# Patient Record
Sex: MALE | Race: WHITE | NOT HISPANIC OR LATINO | Employment: FULL TIME | URBAN - NONMETROPOLITAN AREA
[De-identification: names, ages, dates, MRNs, and addresses within clinical notes are randomized per-mention and may not be internally consistent; named-entity substitution may affect disease eponyms.]

---

## 2023-02-05 PROBLEM — I45.81 LONG QT SYNDROME ASSOCIATED WITH MUTATION IN KCNQ1 GENE: Status: ACTIVE | Noted: 2023-02-05

## 2023-02-05 PROBLEM — K57.90 DIVERTICULOSIS: Status: ACTIVE | Noted: 2023-02-05

## 2023-02-05 PROBLEM — J11.1 INFLUENZA: Status: ACTIVE | Noted: 2023-02-05

## 2023-02-05 PROBLEM — R25.2 MUSCLE CRAMPS: Status: ACTIVE | Noted: 2023-02-05

## 2023-02-05 PROBLEM — M54.30 SCIATICA: Status: ACTIVE | Noted: 2023-02-05

## 2023-02-05 PROBLEM — K21.9 GERD (GASTROESOPHAGEAL REFLUX DISEASE): Status: ACTIVE | Noted: 2023-02-05

## 2023-02-05 PROBLEM — F17.200 NICOTINE DEPENDENCE: Status: ACTIVE | Noted: 2023-02-05

## 2023-02-05 PROBLEM — Z15.89: Status: ACTIVE | Noted: 2023-02-05

## 2023-02-05 PROBLEM — K57.32 DIVERTICULITIS OF COLON: Status: ACTIVE | Noted: 2023-02-05

## 2023-02-05 PROBLEM — R73.09 ELEVATED GLUCOSE: Status: ACTIVE | Noted: 2023-02-05

## 2023-02-05 PROBLEM — E03.9 HYPOTHYROIDISM: Status: ACTIVE | Noted: 2023-02-05

## 2023-02-05 PROBLEM — M79.605 PAIN OF LEFT LOWER EXTREMITY: Status: ACTIVE | Noted: 2023-02-05

## 2023-02-05 RX ORDER — LEVOTHYROXINE SODIUM 75 UG/1
1 TABLET ORAL DAILY
COMMUNITY
Start: 2022-03-28 | End: 2023-03-09 | Stop reason: SDUPTHER

## 2023-02-05 RX ORDER — MELOXICAM 15 MG/1
1 TABLET ORAL DAILY
COMMUNITY
Start: 2022-09-13

## 2023-02-23 LAB
C REACTIVE PROTEIN (MG/L) IN SER/PLAS: 0.41 MG/DL
SEDIMENTATION RATE, ERYTHROCYTE: 9 MM/H (ref 0–15)

## 2023-02-24 LAB
ANA PATTERN: ABNORMAL
ANA TITER: ABNORMAL
ANTI-NUCLEAR ANTIBODY (ANA): POSITIVE

## 2023-03-09 ENCOUNTER — OFFICE VISIT (OUTPATIENT)
Dept: PRIMARY CARE | Facility: CLINIC | Age: 48
End: 2023-03-09
Payer: COMMERCIAL

## 2023-03-09 VITALS
SYSTOLIC BLOOD PRESSURE: 142 MMHG | HEIGHT: 72 IN | DIASTOLIC BLOOD PRESSURE: 96 MMHG | WEIGHT: 178.2 LBS | BODY MASS INDEX: 24.14 KG/M2 | HEART RATE: 81 BPM

## 2023-03-09 DIAGNOSIS — M54.32 SCIATICA OF LEFT SIDE: ICD-10-CM

## 2023-03-09 DIAGNOSIS — R25.2 MUSCLE CRAMPS: ICD-10-CM

## 2023-03-09 DIAGNOSIS — E03.9 ACQUIRED HYPOTHYROIDISM: ICD-10-CM

## 2023-03-09 DIAGNOSIS — I45.81 LONG QT SYNDROME ASSOCIATED WITH MUTATION IN KCNQ1 GENE: ICD-10-CM

## 2023-03-09 DIAGNOSIS — K21.9 GASTROESOPHAGEAL REFLUX DISEASE WITHOUT ESOPHAGITIS: ICD-10-CM

## 2023-03-09 DIAGNOSIS — F17.210 CIGARETTE NICOTINE DEPENDENCE WITHOUT COMPLICATION: Primary | ICD-10-CM

## 2023-03-09 PROCEDURE — 99214 OFFICE O/P EST MOD 30 MIN: CPT | Performed by: REGISTERED NURSE

## 2023-03-09 RX ORDER — PANTOPRAZOLE SODIUM 40 MG/1
40 TABLET, DELAYED RELEASE ORAL
COMMUNITY
Start: 2023-02-25

## 2023-03-09 RX ORDER — DIPHENHYDRAMINE HCL 25 MG
4 CAPSULE ORAL AS NEEDED
Qty: 100 EACH | Refills: 0 | Status: SHIPPED | OUTPATIENT
Start: 2023-03-09 | End: 2023-06-08 | Stop reason: SINTOL

## 2023-03-09 RX ORDER — LEVOTHYROXINE SODIUM 88 UG/1
88 TABLET ORAL DAILY
Qty: 30 TABLET | Refills: 2 | Status: SHIPPED | OUTPATIENT
Start: 2023-03-09 | End: 2023-04-04

## 2023-03-09 RX ORDER — NAPROXEN 500 MG/1
500 TABLET ORAL
COMMUNITY
Start: 2022-06-18 | End: 2023-04-21 | Stop reason: ALTCHOICE

## 2023-03-09 RX ORDER — ASPIRIN 81 MG/1
81 TABLET ORAL DAILY
COMMUNITY

## 2023-03-09 ASSESSMENT — ENCOUNTER SYMPTOMS
RHINORRHEA: 0
CONSTIPATION: 0
DIARRHEA: 0
FEVER: 0
NERVOUS/ANXIOUS: 0
CONFUSION: 0
WEAKNESS: 0
COUGH: 0
DIZZINESS: 0
WHEEZING: 0
ABDOMINAL PAIN: 0
DIFFICULTY URINATING: 0
WOUND: 0
SHORTNESS OF BREATH: 0
NAUSEA: 0
HEADACHES: 0
FREQUENCY: 0
EYE REDNESS: 0
CHILLS: 0
EYE DISCHARGE: 0
VOMITING: 0

## 2023-03-09 NOTE — PROGRESS NOTES
Subjective   Patient ID: Sanford Jones is a 47 y.o. male who presents for Follow-up (Discuss thyroid. ).    HPI     Saw cardiology and they told him that he does have raynaud's and that he had a positive LYNETTE. He is going to see Rheumatology, information provided for Dr. Mendez.     Hypothyroidism: Had his thyroid checked in December and it was elevated at 11.95  He has been on levothyroxine 75mcg daily and tolerating well.      Nicotine Dependence: He is a smoker and had quit for 3 years, with his new job he started smoking again. He has quit off and on multiple times. He has been thinking about quitting.   Would like to stop smoking: He tried cold turkey. He smokes when he is stressed, but sometimes only uses the cigarettes as a habit and does not actually smoke it. Discussed options like gum and patches. Currently about 0.5-1ppd. Depending on the day      GERD- Controlled on medication Pantoprazole 40mg twice daily      All other systems reviewed and negative for complaint unless stated above.      PMH: Hypothyroidism, Frost bite, GERD, hernia   Surgery: none   Family: Brother with hyperthyroid. DM, diverticulitis, Arthritis, heart disease.   smoker: yes, smokes about half a pack to a pack. since he was 10   Etoh: yes, not often on the weekends, one or two on a Saturday might have 6   Drug use: none        Review of Systems   Constitutional:  Negative for chills and fever.   HENT:  Negative for congestion, ear pain and rhinorrhea.    Eyes:  Negative for discharge and redness.   Respiratory:  Negative for cough, shortness of breath and wheezing.    Cardiovascular:  Negative for chest pain and leg swelling.   Gastrointestinal:  Negative for abdominal pain, constipation, diarrhea, nausea and vomiting.   Genitourinary:  Negative for difficulty urinating, frequency and urgency.   Musculoskeletal:  Negative for gait problem.   Skin:  Negative for rash and wound.   Neurological:  Negative for dizziness, weakness  and headaches.   Psychiatric/Behavioral:  Negative for confusion. The patient is not nervous/anxious.        Objective   BP (!) 142/96 (BP Location: Right arm)   Pulse 81   Ht 1.829 m (6')   Wt 80.8 kg (178 lb 3.2 oz)   BMI 24.17 kg/m²     Physical Exam  Vitals reviewed.   Constitutional:       Appearance: Normal appearance.   HENT:      Head: Normocephalic.      Right Ear: Tympanic membrane, ear canal and external ear normal.      Left Ear: Tympanic membrane, ear canal and external ear normal.      Nose: No rhinorrhea.      Mouth/Throat:      Mouth: Mucous membranes are moist.      Pharynx: Oropharynx is clear.   Eyes:      Pupils: Pupils are equal, round, and reactive to light.   Cardiovascular:      Rate and Rhythm: Normal rate and regular rhythm.      Pulses: Normal pulses.   Pulmonary:      Effort: Pulmonary effort is normal.      Breath sounds: Normal breath sounds.   Abdominal:      General: Abdomen is flat. Bowel sounds are normal.      Palpations: Abdomen is soft.   Musculoskeletal:         General: No tenderness. Normal range of motion.      Right lower leg: No edema.      Left lower leg: No edema.   Lymphadenopathy:      Cervical: No cervical adenopathy.   Skin:     General: Skin is warm and dry.      Findings: No rash.   Neurological:      Mental Status: He is alert and oriented to person, place, and time.   Psychiatric:         Mood and Affect: Mood normal.         Behavior: Behavior normal.       Assessment/Plan        #nicotine dependence   Trial gum      #Hypothyroidism   Increased to 88mcg today   Check tsh in 6 weeks   Follow up after tsh check     #Positive LYNETTE  #Raynauds  Referral for rhumatology   May try calcium channel blocker in the future      #Muscle cramps  #Leg pain   naproxen is not that good anymore  Trial meloxicam   may try diclofenac next   encouraged hydration      #GERD  #Diverticulosis  on pantoprazole  no acute issues     #BMI 35   encourage diet and exercise   following with  GI at [facility]     #HCM  Colonoscopy: Done within the past 2 years

## 2023-04-01 DIAGNOSIS — E03.9 ACQUIRED HYPOTHYROIDISM: ICD-10-CM

## 2023-04-04 RX ORDER — LEVOTHYROXINE SODIUM 88 UG/1
TABLET ORAL
Qty: 30 TABLET | Refills: 2 | Status: SHIPPED | OUTPATIENT
Start: 2023-04-04 | End: 2023-04-21

## 2023-04-19 ENCOUNTER — LAB (OUTPATIENT)
Dept: LAB | Facility: LAB | Age: 48
End: 2023-04-19
Payer: COMMERCIAL

## 2023-04-19 DIAGNOSIS — E03.9 ACQUIRED HYPOTHYROIDISM: ICD-10-CM

## 2023-04-19 LAB
THYROTROPIN (MIU/L) IN SER/PLAS BY DETECTION LIMIT <= 0.05 MIU/L: 11.08 MIU/L (ref 0.44–3.98)
THYROXINE (T4) FREE (NG/DL) IN SER/PLAS: 0.98 NG/DL (ref 0.61–1.12)

## 2023-04-19 PROCEDURE — 84439 ASSAY OF FREE THYROXINE: CPT

## 2023-04-19 PROCEDURE — 36415 COLL VENOUS BLD VENIPUNCTURE: CPT

## 2023-04-19 PROCEDURE — 84443 ASSAY THYROID STIM HORMONE: CPT

## 2023-04-21 ENCOUNTER — OFFICE VISIT (OUTPATIENT)
Dept: PRIMARY CARE | Facility: CLINIC | Age: 48
End: 2023-04-21
Payer: COMMERCIAL

## 2023-04-21 VITALS
BODY MASS INDEX: 24.24 KG/M2 | SYSTOLIC BLOOD PRESSURE: 141 MMHG | HEART RATE: 87 BPM | WEIGHT: 179 LBS | DIASTOLIC BLOOD PRESSURE: 104 MMHG | HEIGHT: 72 IN

## 2023-04-21 DIAGNOSIS — F17.210 CIGARETTE NICOTINE DEPENDENCE WITHOUT COMPLICATION: Primary | ICD-10-CM

## 2023-04-21 DIAGNOSIS — T78.40XA ALLERGY, INITIAL ENCOUNTER: ICD-10-CM

## 2023-04-21 DIAGNOSIS — E03.9 ACQUIRED HYPOTHYROIDISM: ICD-10-CM

## 2023-04-21 DIAGNOSIS — I45.81 LONG QT SYNDROME ASSOCIATED WITH MUTATION IN KCNQ1 GENE: ICD-10-CM

## 2023-04-21 PROCEDURE — 99214 OFFICE O/P EST MOD 30 MIN: CPT | Performed by: REGISTERED NURSE

## 2023-04-21 RX ORDER — METFORMIN HYDROCHLORIDE 500 MG/1
1 TABLET ORAL
COMMUNITY
Start: 2022-03-22 | End: 2023-04-21 | Stop reason: WASHOUT

## 2023-04-21 RX ORDER — LEVOTHYROXINE SODIUM 50 UG/1
1 TABLET ORAL DAILY
COMMUNITY
Start: 2022-04-19 | End: 2023-04-21 | Stop reason: SDUPTHER

## 2023-04-21 RX ORDER — IBUPROFEN 200 MG
1 TABLET ORAL EVERY 24 HOURS
Qty: 30 PATCH | Refills: 0 | Status: SHIPPED | OUTPATIENT
Start: 2023-04-21 | End: 2023-05-19

## 2023-04-21 RX ORDER — NIFEDIPINE 60 MG/1
60 TABLET, FILM COATED, EXTENDED RELEASE ORAL
COMMUNITY
Start: 2022-03-02 | End: 2023-04-21 | Stop reason: SINTOL

## 2023-04-21 RX ORDER — NIFEDIPINE 30 MG/1
1 TABLET, FILM COATED, EXTENDED RELEASE ORAL DAILY
COMMUNITY
Start: 2022-04-14 | End: 2023-04-21 | Stop reason: SINTOL

## 2023-04-21 RX ORDER — LEVOTHYROXINE SODIUM 100 UG/1
100 TABLET ORAL DAILY
Qty: 30 TABLET | Refills: 11 | Status: SHIPPED | OUTPATIENT
Start: 2023-04-21 | End: 2023-05-15

## 2023-04-21 ASSESSMENT — ENCOUNTER SYMPTOMS
SHORTNESS OF BREATH: 0
HEADACHES: 0
NERVOUS/ANXIOUS: 0
DIARRHEA: 0
WHEEZING: 0
CONSTIPATION: 0
FREQUENCY: 0
WOUND: 0
EYE REDNESS: 0
DIFFICULTY URINATING: 0
EYE DISCHARGE: 0
COUGH: 0
DIZZINESS: 0
CHILLS: 0
CONFUSION: 0
FEVER: 0
RHINORRHEA: 0
ABDOMINAL PAIN: 0
WEAKNESS: 0
NAUSEA: 0
VOMITING: 0

## 2023-04-21 NOTE — PROGRESS NOTES
Subjective   Patient ID: Sanford Jones is a 47 y.o. 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    HPI   Saw cardiology and they told him that he does have raynaud's and that he had a positive LYNETTE. He is going to see Rheumatology, information provided for Dr. Mendez.     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 This is keeping him up at night. Sneezing, coughing.      Hypothyroidism: Had his thyroid checked in December and it was elevated at 11. He has been on levothyroxine 75mcg daily and tolerating well.      Nicotine Dependence: He is a smoker and had quit for 3 years, with his new job he started smoking again. He has quit off and on multiple times. He has been thinking about quitting.   Would like to stop smoking: He tried cold turkey. He smokes when he is stressed, but sometimes only uses the cigarettes as a habit and does not actually smoke it. Discussed options like gum and patches. Currently about 0.5-1ppd. Depending on the day   Gum made him nauseated. Would like to try patches now      GERD- Controlled on medication Pantoprazole 40mg twice daily      All other systems reviewed and negative for complaint unless stated above.      PMH: Hypothyroidism, Frost bite, GERD, hernia   Surgery: none   Family: Brother with hyperthyroid. DM, diverticulitis, Arthritis, heart disease.   smoker: yes, smokes about half a pack to a pack. since he was 10   Etoh: yes, not often on the weekends, one or two on a Saturday might have 6   Drug use: none        Review of Systems   Constitutional:  Negative for chills and fever.   HENT:  Negative for congestion, ear pain and rhinorrhea.    Eyes:  Negative for discharge and redness.   Respiratory:   Negative for cough, shortness of breath and wheezing.    Cardiovascular:  Negative for chest pain and leg swelling.   Gastrointestinal:  Negative for abdominal pain, constipation, diarrhea, nausea and vomiting.   Genitourinary:  Negative for difficulty urinating, frequency and urgency.   Musculoskeletal:  Negative for gait problem.   Skin:  Negative for rash and wound.   Neurological:  Negative for dizziness, weakness and headaches.   Psychiatric/Behavioral:  Negative for confusion. The patient is not nervous/anxious.        Objective   BP (!) 141/104 (BP Location: Right arm, Patient Position: Sitting, BP Cuff Size: Adult)   Pulse 87   Ht 1.829 m (6')   Wt 81.2 kg (179 lb)   BMI 24.28 kg/m²     Physical Exam  Vitals reviewed.   Constitutional:       Appearance: Normal appearance.   HENT:      Head: Normocephalic.      Right Ear: Tympanic membrane, ear canal and external ear normal.      Left Ear: Tympanic membrane, ear canal and external ear normal.      Nose: No rhinorrhea.      Mouth/Throat:      Mouth: Mucous membranes are moist.      Pharynx: Oropharynx is clear.   Eyes:      Pupils: Pupils are equal, round, and reactive to light.   Cardiovascular:      Rate and Rhythm: Normal rate and regular rhythm.      Pulses: Normal pulses.   Pulmonary:      Effort: Pulmonary effort is normal.      Breath sounds: Normal breath sounds.   Abdominal:      General: Abdomen is flat. Bowel sounds are normal.      Palpations: Abdomen is soft.   Musculoskeletal:         General: No tenderness. Normal range of motion.      Right lower leg: No edema.      Left lower leg: No edema.   Lymphadenopathy:      Cervical: No cervical adenopathy.   Skin:     General: Skin is warm and dry.      Findings: No rash.   Neurological:      Mental Status: He is alert and oriented to person, place, and time.   Psychiatric:         Mood and Affect: Mood normal.         Behavior: Behavior normal.       Assessment/Plan        #nicotine dependence    Gum made him sick   Would like to try patches   Rx patches     #allergies  Take daily medication   Can take benadryl for acute episodes   Blood work ordered     #Hypothyroidism   Increased to 100mcg today   Check tsh in 6 weeks   Follow up after tsh check      #Positive LYNETTE  #Raynauds  Referral for rhumatology   May try calcium channel blocker in the future      #Muscle cramps  #Leg pain   naproxen is not that good anymore  Trial meloxicam   may try diclofenac next   encouraged hydration      #GERD  #Diverticulosis  on pantoprazole  no acute issues     #BMI 35   encourage diet and exercise   following with GI at [facility]      #HCM  Colonoscopy: Done within the past 2 years

## 2023-05-15 DIAGNOSIS — E03.9 ACQUIRED HYPOTHYROIDISM: ICD-10-CM

## 2023-05-15 RX ORDER — LEVOTHYROXINE SODIUM 100 UG/1
TABLET ORAL
Qty: 30 TABLET | Refills: 11 | Status: SHIPPED | OUTPATIENT
Start: 2023-05-15 | End: 2024-03-26

## 2023-05-18 DIAGNOSIS — F17.210 CIGARETTE NICOTINE DEPENDENCE WITHOUT COMPLICATION: ICD-10-CM

## 2023-05-19 RX ORDER — IBUPROFEN 200 MG
1 TABLET ORAL EVERY 24 HOURS
Qty: 28 PATCH | Refills: 1 | Status: SHIPPED | OUTPATIENT
Start: 2023-05-19 | End: 2023-08-08

## 2023-06-06 ENCOUNTER — LAB (OUTPATIENT)
Dept: LAB | Facility: LAB | Age: 48
End: 2023-06-06
Payer: COMMERCIAL

## 2023-06-06 DIAGNOSIS — T78.40XA ALLERGY, INITIAL ENCOUNTER: ICD-10-CM

## 2023-06-06 DIAGNOSIS — E03.9 ACQUIRED HYPOTHYROIDISM: ICD-10-CM

## 2023-06-06 PROBLEM — D12.6 TUBULAR ADENOMA OF COLON: Status: ACTIVE | Noted: 2021-04-14

## 2023-06-06 PROBLEM — T33.90XA FROSTBITE: Status: RESOLVED | Noted: 2022-03-02 | Resolved: 2023-06-06

## 2023-06-06 PROBLEM — I73.00 RAYNAUD'S DISEASE WITHOUT GANGRENE: Status: ACTIVE | Noted: 2022-03-02

## 2023-06-06 PROBLEM — F17.200 SMOKER: Status: ACTIVE | Noted: 2018-01-31

## 2023-06-06 PROBLEM — R35.89 POLYURIA: Status: RESOLVED | Noted: 2018-01-31 | Resolved: 2023-06-06

## 2023-06-06 LAB — THYROTROPIN (MIU/L) IN SER/PLAS BY DETECTION LIMIT <= 0.05 MIU/L: 3.76 MIU/L (ref 0.44–3.98)

## 2023-06-06 PROCEDURE — 86003 ALLG SPEC IGE CRUDE XTRC EA: CPT

## 2023-06-06 PROCEDURE — 36415 COLL VENOUS BLD VENIPUNCTURE: CPT

## 2023-06-06 PROCEDURE — 82785 ASSAY OF IGE: CPT

## 2023-06-06 PROCEDURE — 84443 ASSAY THYROID STIM HORMONE: CPT

## 2023-06-06 NOTE — PROGRESS NOTES
"Subjective   Patient ID: Sanford Jones is a 47 y.o. male who presents for Follow-up (Pt presents today for a 6 wk follow up; nicotine patches kind of working, \"yes and no\"; he pulled a back muscle, left side, very painful; ).    HPI     Back pain: Pulled a muscle on left side, having a lot of pain on that side. Has had issues with this multiple times over the past 10 years. Has times where it will just lock up. Behind shoulder blade, wife has massaged it, tried heat. Lasts about 3-4 days and then it goes away.     Saw cardiology and they told him that he does have raynaud's and that he had a positive LYNETTE. He is going to see Rheumatology, information provided for Dr. Mendez. (Going July 12th)     Hot tingling behind the knee. Goes down behind. Drinking more water helps with his pain in his toes.     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 This is keeping him up at night. Sneezing, coughing.      Hypothyroidism: Had his thyroid checked in December and it was elevated at 11. He has been on levothyroxine 75mcg daily and tolerating well.      Nicotine Dependence: He is a smoker and had quit for 3 years, with his new job he started smoking again. He has quit off and on multiple times. He has been thinking about quitting.   Would like to stop smoking: He tried cold turkey. He smokes when he is stressed, but sometimes only uses the cigarettes as a habit and does not actually smoke it. Discussed options like gum and patches. Currently about 0.5-1ppd. Depending on the day   Gum made him nauseated. Would like to try patches now      GERD- Controlled on medication Pantoprazole 40mg twice daily      All other systems reviewed and negative for complaint unless stated above.     Review of Systems   Constitutional:  Negative for chills and fever.   HENT:  Negative for congestion, ear pain and rhinorrhea.    Eyes:  Negative for discharge and redness.   Respiratory:  Negative for cough, " shortness of breath and wheezing.    Cardiovascular:  Negative for chest pain and leg swelling.   Gastrointestinal:  Negative for abdominal pain, constipation, diarrhea, nausea and vomiting.   Genitourinary:  Negative for difficulty urinating, frequency and urgency.   Musculoskeletal:  Positive for back pain and gait problem.   Skin:  Negative for rash and wound.   Neurological:  Negative for dizziness, weakness and headaches.   Psychiatric/Behavioral:  Negative for confusion. The patient is not nervous/anxious.        Objective   /78 (BP Location: Right arm, Patient Position: Sitting, BP Cuff Size: Large adult)   Pulse 70   Ht 1.829 m (6')   Wt 82.8 kg (182 lb 9.6 oz)   SpO2 98%   BMI 24.77 kg/m²     Physical Exam  Vitals reviewed.   Constitutional:       Appearance: Normal appearance.   HENT:      Head: Normocephalic.      Right Ear: Tympanic membrane, ear canal and external ear normal.      Left Ear: Tympanic membrane, ear canal and external ear normal.      Nose: No rhinorrhea.      Mouth/Throat:      Mouth: Mucous membranes are moist.      Pharynx: Oropharynx is clear.   Eyes:      Pupils: Pupils are equal, round, and reactive to light.   Cardiovascular:      Rate and Rhythm: Normal rate and regular rhythm.      Pulses: Normal pulses.   Pulmonary:      Effort: Pulmonary effort is normal.      Breath sounds: Normal breath sounds.   Abdominal:      General: Abdomen is flat. Bowel sounds are normal.      Palpations: Abdomen is soft.   Musculoskeletal:         General: Tenderness present. Normal range of motion.      Right lower leg: No edema.      Left lower leg: No edema.   Lymphadenopathy:      Cervical: No cervical adenopathy.   Skin:     General: Skin is warm and dry.      Findings: No rash.   Neurological:      Mental Status: He is alert and oriented to person, place, and time.   Psychiatric:         Mood and Affect: Mood normal.         Behavior: Behavior normal.         Assessment/Plan       #nicotine dependence   Gum made him sick   Would like to try patches   Rx patches      #allergies  Take daily medication   Can take benadryl for acute episodes   Blood work ordered      #Hypothyroidism   Increased to 100mcg today   TSH normal      #Positive LYNETTE  #Raynauds  Referral for rhumatology   Getting in with Dr. Mendez   May try calcium channel blocker in the future   Trial amlodipine 5mg daily   May need to increase medication     #Muscle cramps  #Leg pain   Rx cyclobenzaprine   naproxen is not that good anymore  Trial meloxicam   may try diclofenac next   encouraged hydration      #GERD  #Diverticulosis  on pantoprazole  no acute issues     #BMI 35   encourage diet and exercise   following with GI at [facility]      #HCM  Colonoscopy: Done within the past 2 years

## 2023-06-07 LAB
ALLERGEN ANIMAL: CAT DANDER IGE (KU/L): <0.1 KU/L
ALLERGEN ANIMAL: DOG DANDER IGE (KU/L): <0.1 KU/L
ALLERGEN GRASS: BERMUDA GRASS (CYNODON DACTYLON) IGE (KU/L): <0.1 KU/L
ALLERGEN GRASS: JOHNSON GRASS (SORGHUM HALEPENSE) IGE (KU/L): <0.1 KU/L
ALLERGEN GRASS: MEADOW GRASS, KENTUCKY BLUE (POA PRATENSIS )IGE (KU/L): <0.1 KU/L
ALLERGEN GRASS: TIMOTHY GRASS (PHLEUM PRATENSE) IGE (KU/L): <0.1 KU/L
ALLERGEN INSECT: COCKROACH IGE: <0.1 KU/L
ALLERGEN MICROORGANISM: ALTERNARIA ALTERNATA IGE (KU/L): <0.1 KU/L
ALLERGEN MICROORGANISM: ASPERGILLUS FUMIGATUS IGE (KU/L): <0.1 KU/L
ALLERGEN MICROORGANISM: CLADOSPORIUM HERBARUM IGE (KU/L): <0.1 KU/L
ALLERGEN MICROORGANISM: PENICILLIUM CHRYSOGENUM (P. NOTATUM) IGE (KU/L): <0.1 KU/L
ALLERGEN MITE: DERMATOPHAGOIDES FARINAE (HOUSE DUST MITE) IGE (KU/L): 0.44 KU/L
ALLERGEN MITE: DERMATOPHAGOIDES PTERONYSSINUS (HOUSE DUST MITE) IGE (KU/L): 0.64 KU/L
ALLERGEN TREE: BOX-ELDER (ACER NEGUNDO) IGE (KU/L): <0.1 KU/L
ALLERGEN TREE: COMMON SILVER BIRCH (BETULA VERRUCOSA) IGE (KU/L): <0.1 KU/L
ALLERGEN TREE: COTTONWOOD (POPULUS DELTOIDES) IGE (KU/L): <0.1 KU/L
ALLERGEN TREE: ELM (ULMUS AMERICANA) IGE (KU/L): <0.1 KU/L
ALLERGEN TREE: MAPLE LEAF SYCAMORE, LONDON PLANE IGE (KU/L): <0.1 KU/L
ALLERGEN TREE: MOUNTAIN JUNIPER (JUNIPERUS SABINOIDES) IGE (KU/L): <0.1 KU/L
ALLERGEN TREE: MULBERRY (MORUS ALBA) IGE (KU/L): <0.1 KU/L
ALLERGEN TREE: OAK (QUERCUS ALBA) IGE (KU/L): <0.1 KU/L
ALLERGEN TREE: PECAN, HICKORY (CARYA PECAN) IGE (KU/L): <0.1 KU/L
ALLERGEN TREE: WALNUT IGE: <0.1 KU/L
ALLERGEN TREE: WHITE ASH (FRAXINUS AMERICANA) IGE (KU/L): <0.1 KU/L
ALLERGEN WEED: COMMON PIGWEED (AMARANTHUS RETROFLEXUS) IGE (KU/L): <0.1 KU/L
ALLERGEN WEED: COMMON RAGWEED (AMB. ARTEMISIIFOLIA/A. ELATIOR) IGE (KU/L): <0.1 KU/L
ALLERGEN WEED: GOOSEFOOT, LAMB'S QUARTERS (CHENOPODIUM ALBUM) IGE (KU/L): <0.1 KU/L
ALLERGEN WEED: PLANTAIN (ENGLISH), RIBWORT (PLANTAGO LANCEOLATA) IGE (KU/L): <0.1 KU/L
ALLERGEN WEED: PRICKLY SALTWORT/RUSSIAN THISTLE (SALSOLA KALI) IGE (KU/L): <0.1 KU/L
ALLERGEN WEED: SHEEP SORREL (RUMEX ACETOSELLA) IGE (KU/L): <0.1 KU/L
IMMUNOCAP IGE: 13.6 KU/L (ref 0–214)
IMMUNOCAP INTERPRETATION: ABNORMAL

## 2023-06-08 ENCOUNTER — OFFICE VISIT (OUTPATIENT)
Dept: PRIMARY CARE | Facility: CLINIC | Age: 48
End: 2023-06-08
Payer: COMMERCIAL

## 2023-06-08 VITALS
SYSTOLIC BLOOD PRESSURE: 116 MMHG | WEIGHT: 182.6 LBS | DIASTOLIC BLOOD PRESSURE: 78 MMHG | HEIGHT: 72 IN | HEART RATE: 70 BPM | BODY MASS INDEX: 24.73 KG/M2 | OXYGEN SATURATION: 98 %

## 2023-06-08 DIAGNOSIS — I73.00 RAYNAUD'S DISEASE WITHOUT GANGRENE: ICD-10-CM

## 2023-06-08 DIAGNOSIS — M54.32 SCIATICA OF LEFT SIDE: ICD-10-CM

## 2023-06-08 DIAGNOSIS — M62.838 MUSCLE SPASM: Primary | ICD-10-CM

## 2023-06-08 DIAGNOSIS — E03.9 ACQUIRED HYPOTHYROIDISM: ICD-10-CM

## 2023-06-08 DIAGNOSIS — F17.210 CIGARETTE NICOTINE DEPENDENCE WITHOUT COMPLICATION: ICD-10-CM

## 2023-06-08 PROCEDURE — 99214 OFFICE O/P EST MOD 30 MIN: CPT | Performed by: REGISTERED NURSE

## 2023-06-08 RX ORDER — AMLODIPINE BESYLATE 5 MG/1
5 TABLET ORAL DAILY
Qty: 30 TABLET | Refills: 5 | Status: SHIPPED | OUTPATIENT
Start: 2023-06-08 | End: 2023-07-03

## 2023-06-08 RX ORDER — CYCLOBENZAPRINE HCL 5 MG
5 TABLET ORAL 3 TIMES DAILY PRN
Qty: 30 TABLET | Refills: 0 | Status: SHIPPED | OUTPATIENT
Start: 2023-06-08 | End: 2023-07-08

## 2023-06-08 ASSESSMENT — ENCOUNTER SYMPTOMS
WOUND: 0
SHORTNESS OF BREATH: 0
CONFUSION: 0
CONSTIPATION: 0
ABDOMINAL PAIN: 0
FREQUENCY: 0
DIZZINESS: 0
NAUSEA: 0
COUGH: 0
NERVOUS/ANXIOUS: 0
DIARRHEA: 0
BACK PAIN: 1
VOMITING: 0
CHILLS: 0
FEVER: 0
HEADACHES: 0
WEAKNESS: 0
RHINORRHEA: 0
DIFFICULTY URINATING: 0
EYE REDNESS: 0
WHEEZING: 0
EYE DISCHARGE: 0

## 2023-07-01 DIAGNOSIS — I73.00 RAYNAUD'S DISEASE WITHOUT GANGRENE: ICD-10-CM

## 2023-07-03 RX ORDER — AMLODIPINE BESYLATE 5 MG/1
TABLET ORAL
Qty: 30 TABLET | Refills: 5 | Status: SHIPPED | OUTPATIENT
Start: 2023-07-03 | End: 2023-12-29

## 2023-07-07 DIAGNOSIS — R06.2 WHEEZING: ICD-10-CM

## 2023-07-07 RX ORDER — IPRATROPIUM BROMIDE AND ALBUTEROL 20; 100 UG/1; UG/1
SPRAY, METERED RESPIRATORY (INHALATION) 2 TIMES DAILY
COMMUNITY
Start: 2023-07-04 | End: 2023-08-02

## 2023-07-07 RX ORDER — ALBUTEROL SULFATE 0.83 MG/ML
2.5 SOLUTION RESPIRATORY (INHALATION) EVERY 4 HOURS PRN
COMMUNITY
End: 2023-07-07 | Stop reason: SDUPTHER

## 2023-07-07 RX ORDER — PREDNISONE 20 MG/1
TABLET ORAL
COMMUNITY
Start: 2023-07-04 | End: 2023-07-07

## 2023-07-07 RX ORDER — ALBUTEROL SULFATE 0.83 MG/ML
2.5 SOLUTION RESPIRATORY (INHALATION) EVERY 4 HOURS PRN
Qty: 75 ML | Refills: 1 | Status: SHIPPED | OUTPATIENT
Start: 2023-07-07

## 2023-07-12 LAB
ANTICARDIOLIPIN IGA ANTIBODY: 0.8 APL U/ML (ref 0–20)
ANTICARDIOLIPIN IGG ANTIBODY: <1.6 GPL U/ML (ref 0–20)
ANTICARDIOLIPIN IGM ANTIBODY: 0.6 MPL U/ML (ref 0–20)
BASOPHILS (10*3/UL) IN BLOOD BY AUTOMATED COUNT: 0.04 X10E9/L (ref 0–0.1)
BASOPHILS/100 LEUKOCYTES IN BLOOD BY AUTOMATED COUNT: 0.4 % (ref 0–2)
BETA 2 GLYCOPROTEIN 1 IGA AB IN SERUM: 1 U/ML (ref 0–20)
BETA 2 GLYCOPROTEIN 1 IGG AB IN SERUM: <1.4 U/ML (ref 0–20)
BETA 2 GLYCOPROTEIN 1 IGM ANTIBODY IN SERUM: 1.1 U/ML (ref 0–20)
COMPLEMENT C3 (MG/DL) IN SER/PLAS: 168 MG/DL (ref 87–200)
COMPLEMENT C4 (MG/DL) IN SER/PLAS: 33 MG/DL (ref 10–50)
CREATININE (MG/DL) IN URINE: NORMAL
EOSINOPHILS (10*3/UL) IN BLOOD BY AUTOMATED COUNT: 0.24 X10E9/L (ref 0–0.7)
EOSINOPHILS/100 LEUKOCYTES IN BLOOD BY AUTOMATED COUNT: 2.6 % (ref 0–6)
ERYTHROCYTE DISTRIBUTION WIDTH (RATIO) BY AUTOMATED COUNT: 13.5 % (ref 11.5–14.5)
ERYTHROCYTE MEAN CORPUSCULAR HEMOGLOBIN CONCENTRATION (G/DL) BY AUTOMATED: 33.9 G/DL (ref 32–36)
ERYTHROCYTE MEAN CORPUSCULAR VOLUME (FL) BY AUTOMATED COUNT: 87 FL (ref 80–100)
ERYTHROCYTES (10*6/UL) IN BLOOD BY AUTOMATED COUNT: 4.77 X10E12/L (ref 4.5–5.9)
HEMATOCRIT (%) IN BLOOD BY AUTOMATED COUNT: 41.6 % (ref 41–52)
HEMOGLOBIN (G/DL) IN BLOOD: 14.1 G/DL (ref 13.5–17.5)
IMMATURE GRANULOCYTES/100 LEUKOCYTES IN BLOOD BY AUTOMATED COUNT: 1.1 % (ref 0–0.9)
LEUKOCYTES (10*3/UL) IN BLOOD BY AUTOMATED COUNT: 9.2 X10E9/L (ref 4.4–11.3)
LYMPHOCYTES (10*3/UL) IN BLOOD BY AUTOMATED COUNT: 1.78 X10E9/L (ref 1.2–4.8)
LYMPHOCYTES/100 LEUKOCYTES IN BLOOD BY AUTOMATED COUNT: 19.4 % (ref 13–44)
MONOCYTES (10*3/UL) IN BLOOD BY AUTOMATED COUNT: 1.1 X10E9/L (ref 0.1–1)
MONOCYTES/100 LEUKOCYTES IN BLOOD BY AUTOMATED COUNT: 12 % (ref 2–10)
NEUTROPHILS (10*3/UL) IN BLOOD BY AUTOMATED COUNT: 5.91 X10E9/L (ref 1.2–7.7)
NEUTROPHILS/100 LEUKOCYTES IN BLOOD BY AUTOMATED COUNT: 64.5 % (ref 40–80)
PLATELETS (10*3/UL) IN BLOOD AUTOMATED COUNT: 281 X10E9/L (ref 150–450)
PROTEIN (MG/DL) IN URINE: NORMAL
PROTEIN TOTAL: 7.1 G/DL (ref 6.4–8.2)
PROTEIN/CREATININE (MG/MG) IN URINE: NORMAL
THYROPEROXIDASE AB (IU/ML) IN SER/PLAS: >1000 IU/ML

## 2023-07-13 LAB
ANA PATTERN: ABNORMAL
ANA TITER: ABNORMAL
ANTI-CENTROMERE: <0.2 AI
ANTI-CHROMATIN: <0.2 AI
ANTI-DNA (DS): <1 IU/ML
ANTI-JO-1 IGG: <0.2 AI
ANTI-NUCLEAR ANTIBODY (ANA): POSITIVE
ANTI-RIBOSOMAL P: <0.2 AI
ANTI-RNP: 0.4 AI
ANTI-SCL-70: 0.2 AI
ANTI-SM/RNP: <0.2 AI
ANTI-SM: <0.2 AI
ANTI-SSA: >8 AI
ANTI-SSB: >8 AI
CREATININE (MG/DL) IN URINE: 107 MG/DL (ref 20–370)
DILUTE RUSSELL VIPER VENOM TIME CONF: 1.1 RATIO
DILUTE RUSSELL VIPER VENOM TIME SCREEN: 1.22 RATIO
DILUTE RUSSELL VIPER VENOM TIME TEST RATIO: 1.12 RATIO
LUPUS ANTICOAGULANT INTERPRETATION: NORMAL
NORMALIZED SILICA CLOTTING TIME (RATIO) OF PPP: 0.88 RATIO
PROTEIN (MG/DL) IN URINE: 12 MG/DL (ref 5–25)
PROTEIN/CREATININE (MG/MG) IN URINE: 0.11 MG/MG CREAT (ref 0–0.17)
SILICA CLOTTING TIME CONFIRMATION: 1.09 RATIO
SILICA CLOTTING TIME SCREEN: 0.95 RATIO

## 2023-07-14 LAB — THYROGLOBULIN AB (IU/ML) IN SER/PLAS: 36.2 IU/ML (ref 0–4)

## 2023-07-17 LAB
ALBUMIN ELP: 3.9 G/DL (ref 3.4–5)
ALPHA 1: 0.4 G/DL (ref 0.2–0.6)
ALPHA 2: 0.9 G/DL (ref 0.4–1.1)
BETA: 0.8 G/DL (ref 0.5–1.2)
GAMMA GLOBULIN: 1.1 G/DL (ref 0.5–1.4)
PATH REVIEW - SERUM IMMUNOFIXATION: NORMAL
PATH REVIEW-SERUM PROTEIN ELECTROPHORESIS: NORMAL
PROTEIN ELECTROPHORESIS INTERPRETATION: NORMAL
PROTEIN TOTAL: 7.1 G/DL (ref 6.4–8.2)
SERUM IMMUNOFIXATION INTERPRETATION: NORMAL

## 2023-08-03 PROBLEM — I33.0: Status: RESOLVED | Noted: 2023-08-03 | Resolved: 2023-08-03

## 2023-08-03 PROBLEM — I73.00 RAYNAUD'S PHENOMENON: Status: ACTIVE | Noted: 2023-08-03

## 2023-08-03 PROBLEM — R76.8 POSITIVE ANA (ANTINUCLEAR ANTIBODY): Status: ACTIVE | Noted: 2023-08-03

## 2023-08-03 RX ORDER — UBIDECARENONE 30 MG
1 CAPSULE ORAL DAILY
COMMUNITY
Start: 2023-07-12

## 2023-08-03 NOTE — PROGRESS NOTES
Subjective   Patient ID: Sanford Jones is a 48 y.o. male who presents for No chief complaint on file..    HPI   Back pain: Pulled a muscle on left side, having a lot of pain on that side. Has had issues with this multiple times over the past 10 years. Has times where it will just lock up. Behind shoulder blade, wife has massaged it, tried heat. Lasts about 3-4 days and then it goes away.      Saw cardiology and they told him that he does have raynaud's and that he had a positive LYNETTE. He is going to see Rheumatology, information provided for Dr. Mendez. (Going July 12th)      Hot tingling behind the knee. Goes down behind. Drinking more water helps with his pain in his toes.     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 This is keeping him up at night. Sneezing, coughing.      Hypothyroidism: Increased levothyroxine to 100mcg last time, Doing well on higher dose.     Autoimmune: he saw Dr. Mendez had blood work done, his anti-ssa and anti-ssb were >8 and Thyroid Peroxidase was >1000     Nicotine Dependence: He is a smoker and had quit for 3 years, with his new job he started smoking again. He has quit off and on multiple times. He has been thinking about quitting.   Would like to stop smoking: He tried cold turkey. He smokes when he is stressed, but sometimes only uses the cigarettes as a habit and does not actually smoke it. Discussed options like gum and patches. Currently about 0.5-1ppd. Depending on the day   Gum made him nauseated. Would like to try patches now      GERD- Controlled on medication Pantoprazole 40mg twice daily     All other systems reviewed and negative for complaint unless stated above.      Objective   There were no vitals taken for this visit.    Physical Exam  Vitals reviewed.   Constitutional:       Appearance: Normal appearance.   HENT:      Head: Normocephalic.      Right Ear: Tympanic membrane, ear canal and external ear normal.      Left Ear:  Tympanic membrane, ear canal and external ear normal.      Nose: No rhinorrhea.      Mouth/Throat:      Mouth: Mucous membranes are moist.      Pharynx: Oropharynx is clear.   Eyes:      Pupils: Pupils are equal, round, and reactive to light.   Cardiovascular:      Rate and Rhythm: Normal rate and regular rhythm.      Pulses: Normal pulses.   Pulmonary:      Effort: Pulmonary effort is normal.      Breath sounds: Normal breath sounds.   Abdominal:      General: Abdomen is flat. Bowel sounds are normal.      Palpations: Abdomen is soft.   Musculoskeletal:         General: No tenderness. Normal range of motion.      Right lower leg: No edema.      Left lower leg: No edema.   Lymphadenopathy:      Cervical: No cervical adenopathy.   Skin:     General: Skin is warm and dry.      Findings: No rash.   Neurological:      Mental Status: He is alert and oriented to person, place, and time.   Psychiatric:         Mood and Affect: Mood normal.         Behavior: Behavior normal.       Assessment/Plan        #nicotine dependence   Gum made him sick   Would like to try patches   Rx patches   Cutback a lot maybe 6-7 cigs per day      #allergies  Take daily medication   Can take benadryl for acute episodes   Blood work ordered      #Hypothyroidism   Increased to 100mcg today   TSH normal      #Positive LYNETTE  #Raynauds  Referral for rhumatology   Getting in with Dr. Mendez   May try calcium channel blocker in the future   Trial amlodipine 5mg daily   May need to increase medication     #Muscle cramps  #Leg pain   Rx cyclobenzaprine   naproxen is not that good anymore  Trial meloxicam   may try diclofenac next   encouraged hydration   Going to PT      #GERD  #Diverticulosis  on pantoprazole  no acute issues     #BMI 35   encourage diet and exercise   following with GI at [facility]      #HCM  Colonoscopy: Done within the past 2 years      Tremfya Pregnancy And Lactation Text: The risk during pregnancy and breastfeeding is uncertain with this medication. Elidel Counseling: Patient may experience a mild burning sensation during topical application. Elidel is not approved in children less than 2 years of age. There have been case reports of hematologic and skin malignancies in patients using topical calcineurin inhibitors although causality is questionable. Spironolactone Counseling: Patient advised regarding risks of diarrhea, abdominal pain, hyperkalemia, birth defects (for female patients), liver toxicity and renal toxicity. The patient may need blood work to monitor liver and kidney function and potassium levels while on therapy. The patient verbalized understanding of the proper use and possible adverse effects of spironolactone.  All of the patient's questions and concerns were addressed. Erythromycin Pregnancy And Lactation Text: This medication is Pregnancy Category B and is considered safe during pregnancy. It is also excreted in breast milk. Erivedge Counseling- I discussed with the patient the risks of Erivedge including but not limited to nausea, vomiting, diarrhea, constipation, weight loss, changes in the sense of taste, decreased appetite, muscle spasms, and hair loss.  The patient verbalized understanding of the proper use and possible adverse effects of Erivedge.  All of the patient's questions and concerns were addressed. Ivermectin Pregnancy And Lactation Text: This medication is Pregnancy Category C and it isn't known if it is safe during pregnancy. It is also excreted in breast milk. Tazorac Counseling:  Patient advised that medication is irritating and drying.  Patient may need to apply sparingly and wash off after an hour before eventually leaving it on overnight.  The patient verbalized understanding of the proper use and possible adverse effects of tazorac.  All of the patient's questions and concerns were addressed. Itraconazole Counseling:  I discussed with the patient the risks of itraconazole including but not limited to liver damage, nausea/vomiting, neuropathy, and severe allergy.  The patient understands that this medication is best absorbed when taken with acidic beverages such as non-diet cola or ginger ale.  The patient understands that monitoring is required including baseline LFTs and repeat LFTs at intervals.  The patient understands that they are to contact us or the primary physician if concerning signs are noted. Metronidazole Counseling:  I discussed with the patient the risks of metronidazole including but not limited to seizures, nausea/vomiting, a metallic taste in the mouth, nausea/vomiting and severe allergy. Elidel Pregnancy And Lactation Text: This medication is Pregnancy Category C. It is unknown if this medication is excreted in breast milk. Acitretin Counseling:  I discussed with the patient the risks of acitretin including but not limited to hair loss, dry lips/skin/eyes, liver damage, hyperlipidemia, depression/suicidal ideation, photosensitivity.  Serious rare side effects can include but are not limited to pancreatitis, pseudotumor cerebri, bony changes, clot formation/stroke/heart attack.  Patient understands that alcohol is contraindicated since it can result in liver toxicity and significantly prolong the elimination of the drug by many years. Erivedge Pregnancy And Lactation Text: This medication is Pregnancy Category X and is absolutely contraindicated during pregnancy. It is unknown if it is excreted in breast milk. Xeljanz Counseling: I discussed with the patient the risks of Xeljanz therapy including increased risk of infection, liver issues, headache, diarrhea, or cold symptoms. Live vaccines should be avoided. They were instructed to call if they have any problems. Spironolactone Pregnancy And Lactation Text: This medication can cause feminization of the male fetus and should be avoided during pregnancy. The active metabolite is also found in breast milk. Itraconazole Pregnancy And Lactation Text: This medication is Pregnancy Category C and it isn't know if it is safe during pregnancy. It is also excreted in breast milk. SSKI Counseling:  I discussed with the patient the risks of SSKI including but not limited to thyroid abnormalities, metallic taste, GI upset, fever, headache, acne, arthralgias, paraesthesias, lymphadenopathy, easy bleeding, arrhythmias, and allergic reaction. Xelthomasz Pregnancy And Lactation Text: This medication is Pregnancy Category D and is not considered safe during pregnancy.  The risk during breast feeding is also uncertain. Tazorac Pregnancy And Lactation Text: This medication is not safe during pregnancy. It is unknown if this medication is excreted in breast milk. Gabapentin Counseling: I discussed with the patient the risks of gabapentin including but not limited to dizziness, somnolence, fatigue and ataxia. Acitretin Pregnancy And Lactation Text: This medication is Pregnancy Category X and should not be given to women who are pregnant or may become pregnant in the future. This medication is excreted in breast milk. Xolair Counseling:  Patient informed of potential adverse effects including but not limited to fever, muscle aches, rash and allergic reactions.  The patient verbalized understanding of the proper use and possible adverse effects of Xolair.  All of the patient's questions and concerns were addressed. Bexarotene Counseling:  I discussed with the patient the risks of bexarotene including but not limited to hair loss, dry lips/skin/eyes, liver abnormalities, hyperlipidemia, pancreatitis, depression/suicidal ideation, photosensitivity, drug rash/allergic reactions, hypothyroidism, anemia, leukopenia, infection, cataracts, and teratogenicity.  Patient understands that they will need regular blood tests to check lipid profile, liver function tests, white blood cell count, thyroid function tests and pregnancy test if applicable. Ketoconazole Counseling:   Patient counseled regarding improving absorption with orange juice.  Adverse effects include but are not limited to breast enlargement, headache, diarrhea, nausea, upset stomach, liver function test abnormalities, taste disturbance, and stomach pain.  There is a rare possibility of liver failure that can occur when taking ketoconazole. The patient understands that monitoring of LFTs may be required, especially at baseline. The patient verbalized understanding of the proper use and possible adverse effects of ketoconazole.  All of the patient's questions and concerns were addressed. Metronidazole Pregnancy And Lactation Text: This medication is Pregnancy Category B and considered safe during pregnancy.  It is also excreted in breast milk. Gabapentin Pregnancy And Lactation Text: This medication is Pregnancy Category C and isn't considered safe during pregnancy. It is excreted in breast milk. Eucrisa Counseling: Patient may experience a mild burning sensation during topical application. Eucrisa is not approved in children less than 2 years of age. Topical Clindamycin Counseling: Patient counseled that this medication may cause skin irritation or allergic reactions.  In the event of skin irritation, the patient was advised to reduce the amount of the drug applied or use it less frequently.   The patient verbalized understanding of the proper use and possible adverse effects of clindamycin.  All of the patient's questions and concerns were addressed. Topical Clindamycin Pregnancy And Lactation Text: This medication is Pregnancy Category B and is considered safe during pregnancy. It is unknown if it is excreted in breast milk. Minocycline Counseling: Patient advised regarding possible photosensitivity and discoloration of the teeth, skin, lips, tongue and gums.  Patient instructed to avoid sunlight, if possible.  When exposed to sunlight, patients should wear protective clothing, sunglasses, and sunscreen.  The patient was instructed to call the office immediately if the following severe adverse effects occur:  hearing changes, easy bruising/bleeding, severe headache, or vision changes.  The patient verbalized understanding of the proper use and possible adverse effects of minocycline.  All of the patient's questions and concerns were addressed. Ketoconazole Pregnancy And Lactation Text: This medication is Pregnancy Category C and it isn't know if it is safe during pregnancy. It is also excreted in breast milk and breast feeding isn't recommended. Eucrisa Pregnancy And Lactation Text: This medication has not been assigned a Pregnancy Risk Category but animal studies failed to show danger with the topical medication. It is unknown if the medication is excreted in breast milk. Sski Pregnancy And Lactation Text: This medication is Pregnancy Category D and isn't considered safe during pregnancy. It is excreted in breast milk. Thalidomide Counseling: I discussed with the patient the risks of thalidomide including but not limited to birth defects, anxiety, weakness, chest pain, dizziness, cough and severe allergy. Cimzia Counseling:  I discussed with the patient the risks of Cimzia including but not limited to immunosuppression, allergic reactions and infections.  The patient understands that monitoring is required including a PPD at baseline and must alert us or the primary physician if symptoms of infection or other concerning signs are noted. Infliximab Pregnancy And Lactation Text: This medication is Pregnancy Category B and is considered safe during pregnancy. It is unknown if this medication is excreted in breast milk. Glycopyrrolate Counseling:  I discussed with the patient the risks of glycopyrrolate including but not limited to skin rash, drowsiness, dry mouth, difficulty urinating, and blurred vision. Xolair Pregnancy And Lactation Text: This medication is Pregnancy Category B and is considered safe during pregnancy. This medication is excreted in breast milk. Topical Sulfur Applications Counseling: Topical Sulfur Counseling: Patient counseled that this medication may cause skin irritation or allergic reactions.  In the event of skin irritation, the patient was advised to reduce the amount of the drug applied or use it less frequently.   The patient verbalized understanding of the proper use and possible adverse effects of topical sulfur application.  All of the patient's questions and concerns were addressed. Rituxan Counseling:  I discussed with the patient the risks of Rituxan infusions. Side effects can include infusion reactions, severe drug rashes including mucocutaneous reactions, reactivation of latent hepatitis and other infections and rarely progressive multifocal leukoencephalopathy.  All of the patient's questions and concerns were addressed. Bexarotene Pregnancy And Lactation Text: This medication is Pregnancy Category X and should not be given to women who are pregnant or may become pregnant. This medication should not be used if you are breast feeding. Hydroquinone Counseling:  Patient advised that medication may result in skin irritation, lightening (hypopigmentation), dryness, and burning.  In the event of skin irritation, the patient was advised to reduce the amount of the drug applied or use it less frequently.  Rarely, spots that are treated with hydroquinone can become darker (pseudoochronosis).  Should this occur, patient instructed to stop medication and call the office. The patient verbalized understanding of the proper use and possible adverse effects of hydroquinone.  All of the patient's questions and concerns were addressed. Minocycline Pregnancy And Lactation Text: This medication is Pregnancy Category D and not consider safe during pregnancy. It is also excreted in breast milk. Terbinafine Counseling: Patient counseling regarding adverse effects of terbinafine including but not limited to headache, diarrhea, rash, upset stomach, liver function test abnormalities, itching, taste/smell disturbance, nausea, abdominal pain, and flatulence.  There is a rare possibility of liver failure that can occur when taking terbinafine.  The patient understands that a baseline LFT and kidney function test may be required. The patient verbalized understanding of the proper use and possible adverse effects of terbinafine.  All of the patient's questions and concerns were addressed. Isotretinoin Counseling: Patient should get monthly blood tests, not donate blood, not drive at night if vision affected, not share medication, and not undergo elective surgery for 6 months after tx completed. Side effects reviewed, pt to contact office should one occur. Quinolones Counseling:  I discussed with the patient the risks of fluoroquinolones including but not limited to GI upset, allergic reaction, drug rash, diarrhea, dizziness, photosensitivity, yeast infections, liver function test abnormalities, tendonitis/tendon rupture. Glycopyrrolate Pregnancy And Lactation Text: This medication is Pregnancy Category B and is considered safe during pregnancy. It is unknown if it is excreted breast milk. Cimzia Pregnancy And Lactation Text: This medication crosses the placenta but can be considered safe in certain situations. Cimzia may be excreted in breast milk. Terbinafine Pregnancy And Lactation Text: This medication is Pregnancy Category B and is considered safe during pregnancy. It is also excreted in breast milk and breast feeding isn't recommended. Rituxan Pregnancy And Lactation Text: This medication is Pregnancy Category C and it isn't know if it is safe during pregnancy. It is unknown if this medication is excreted in breast milk but similar antibodies are known to be excreted. Topical Sulfur Applications Pregnancy And Lactation Text: This medication is Pregnancy Category C and has an unknown safety profile during pregnancy. It is unknown if this topical medication is excreted in breast milk. Isotretinoin Pregnancy And Lactation Text: This medication is Pregnancy Category X and is considered extremely dangerous during pregnancy. It is unknown if it is excreted in breast milk. Imiquimod Counseling:  I discussed with the patient the risks of imiquimod including but not limited to erythema, scaling, itching, weeping, crusting, and pain.  Patient understands that the inflammatory response to imiquimod is variable from person to person and was educated regarded proper titration schedule.  If flu-like symptoms develop, patient knows to discontinue the medication and contact us. Valtrex Counseling: I discussed with the patient the risks of valacyclovir including but not limited to kidney damage, nausea, vomiting and severe allergy.  The patient understands that if the infection seems to be worsening or is not improving, they are to call. Cosentyx Counseling:  I discussed with the patient the risks of Cosentyx including but not limited to worsening of Crohn's disease, immunosuppression, allergic reactions and infections.  The patient understands that monitoring is required including a PPD at baseline and must alert us or the primary physician if symptoms of infection or other concerning signs are noted. Azathioprine Counseling:  I discussed with the patient the risks of azathioprine including but not limited to myelosuppression, immunosuppression, hepatotoxicity, lymphoma, and infections.  The patient understands that monitoring is required including baseline LFTs, Creatinine, possible TPMP genotyping and weekly CBCs for the first month and then every 2 weeks thereafter.  The patient verbalized understanding of the proper use and possible adverse effects of azathioprine.  All of the patient's questions and concerns were addressed. Hydroxychloroquine Counseling:  I discussed with the patient that a baseline ophthalmologic exam is needed at the start of therapy and every year thereafter while on therapy. A CBC may also be warranted for monitoring.  The side effects of this medication were discussed with the patient, including but not limited to agranulocytosis, aplastic anemia, seizures, rashes, retinopathy, and liver toxicity. Patient instructed to call the office should any adverse effect occur.  The patient verbalized understanding of the proper use and possible adverse effects of Plaquenil.  All the patient's questions and concerns were addressed. Hydroxychloroquine Pregnancy And Lactation Text: This medication has been shown to cause fetal harm but it isn't assigned a Pregnancy Risk Category. There are small amounts excreted in breast milk. Valtrex Pregnancy And Lactation Text: this medication is Pregnancy Category B and is considered safe during pregnancy. This medication is not directly found in breast milk but it's metabolite acyclovir is present. Siliq Counseling:  I discussed with the patient the risks of Siliq including but not limited to new or worsening depression, suicidal thoughts and behavior, immunosuppression, malignancy, posterior leukoencephalopathy syndrome, and serious infections.  The patient understands that monitoring is required including a PPD at baseline and must alert us or the primary physician if symptoms of infection or other concerning signs are noted. There is also a special program designed to monitor depression which is required with Siliq. Zyclara Counseling:  I discussed with the patient the risks of imiquimod including but not limited to erythema, scaling, itching, weeping, crusting, and pain.  Patient understands that the inflammatory response to imiquimod is variable from person to person and was educated regarded proper titration schedule.  If flu-like symptoms develop, patient knows to discontinue the medication and contact us. High Dose Vitamin A Counseling: Side effects reviewed, pt to contact office should one occur. Azithromycin Counseling:  I discussed with the patient the risks of azithromycin including but not limited to GI upset, allergic reaction, drug rash, diarrhea, and yeast infections. Azathioprine Pregnancy And Lactation Text: This medication is Pregnancy Category D and isn't considered safe during pregnancy. It is unknown if this medication is excreted in breast milk. Cellcept Counseling:  I discussed with the patient the risks of mycophenolate mofetil including but not limited to infection/immunosuppression, GI upset, hypokalemia, hypercholesterolemia, bone marrow suppression, lymphoproliferative disorders, malignancy, GI ulceration/bleed/perforation, colitis, interstitial lung disease, kidney failure, progressive multifocal leukoencephalopathy, and birth defects.  The patient understands that monitoring is required including a baseline creatinine and regular CBC testing. In addition, patient must alert us immediately if symptoms of infection or other concerning signs are noted. Azithromycin Pregnancy And Lactation Text: This medication is considered safe during pregnancy and is also secreted in breast milk. High Dose Vitamin A Pregnancy And Lactation Text: High dose vitamin A therapy is contraindicated during pregnancy and breast feeding. Rifampin Counseling: I discussed with the patient the risks of rifampin including but not limited to liver damage, kidney damage, red-orange body fluids, nausea/vomiting and severe allergy. Cimetidine Counseling:  I discussed with the patient the risks of Cimetidine including but not limited to gynecomastia, headache, diarrhea, nausea, drowsiness, arrhythmias, pancreatitis, skin rashes, psychosis, bone marrow suppression and kidney toxicity. Nsaids Counseling: NSAID Counseling: I discussed with the patient that NSAIDs should be taken with food. Prolonged use of NSAIDs can result in the development of stomach ulcers.  Patient advised to stop taking NSAIDs if abdominal pain occurs.  The patient verbalized understanding of the proper use and possible adverse effects of NSAIDs.  All of the patient's questions and concerns were addressed. Dupixent Counseling: I discussed with the patient the risks of dupilumab including but not limited to eye infection and irritation, cold sores, injection site reactions, worsening of asthma, allergic reactions and increased risk of parasitic infection.  Live vaccines should be avoided while taking dupilumab. Dupilumab will also interact with certain medications such as warfarin and cyclosporine. The patient understands that monitoring is required and they must alert us or the primary physician if symptoms of infection or other concerning signs are noted. Minoxidil Counseling: Minoxidil is a topical medication which can increase blood flow where it is applied. It is uncertain how this medication increases hair growth. Side effects are uncommon and include stinging and allergic reactions. Rifampin Pregnancy And Lactation Text: This medication is Pregnancy Category C and it isn't know if it is safe during pregnancy. It is also excreted in breast milk and should not be used if you are breast feeding. Dupixent Pregnancy And Lactation Text: This medication likely crosses the placenta but the risk for the fetus is uncertain. This medication is excreted in breast milk. Simponi Counseling:  I discussed with the patient the risks of golimumab including but not limited to myelosuppression, immunosuppression, autoimmune hepatitis, demyelinating diseases, lymphoma, and serious infections.  The patient understands that monitoring is required including a PPD at baseline and must alert us or the primary physician if symptoms of infection or other concerning signs are noted. Nsaids Pregnancy And Lactation Text: These medications are considered safe up to 30 weeks gestation. It is excreted in breast milk. Odomzo Counseling- I discussed with the patient the risks of Odomzo including but not limited to nausea, vomiting, diarrhea, constipation, weight loss, changes in the sense of taste, decreased appetite, muscle spasms, and hair loss.  The patient verbalized understanding of the proper use and possible adverse effects of Odomzo.  All of the patient's questions and concerns were addressed. Doxepin Counseling:  Patient advised that the medication is sedating and not to drive a car after taking this medication. Patient informed of potential adverse effects including but not limited to dry mouth, urinary retention, and blurry vision.  The patient verbalized understanding of the proper use and possible adverse effects of doxepin.  All of the patient's questions and concerns were addressed. Arava Counseling:  Patient counseled regarding adverse effects of Arava including but not limited to nausea, vomiting, abnormalities in liver function tests. Patients may develop mouth sores, rash, diarrhea, and abnormalities in blood counts. The patient understands that monitoring is required including LFTs and blood counts.  There is a rare possibility of scarring of the liver and lung problems that can occur when taking methotrexate. Persistent nausea, loss of appetite, pale stools, dark urine, cough, and shortness of breath should be reported immediately. Patient advised to discontinue Arava treatment and consult with a physician prior to attempting conception. The patient will have to undergo a treatment to eliminate Arava from the body prior to conception. Bactrim Counseling:  I discussed with the patient the risks of sulfa antibiotics including but not limited to GI upset, allergic reaction, drug rash, diarrhea, dizziness, photosensitivity, and yeast infections.  Rarely, more serious reactions can occur including but not limited to aplastic anemia, agranulocytosis, methemoglobinemia, blood dyscrasias, liver or kidney failure, lung infiltrates or desquamative/blistering drug rashes. Use Enhanced Medication Counseling?: No Sarecycline Counseling: Patient advised regarding possible photosensitivity and discoloration of the teeth, skin, lips, tongue and gums.  Patient instructed to avoid sunlight, if possible.  When exposed to sunlight, patients should wear protective clothing, sunglasses, and sunscreen.  The patient was instructed to call the office immediately if the following severe adverse effects occur:  hearing changes, easy bruising/bleeding, severe headache, or vision changes.  The patient verbalized understanding of the proper use and possible adverse effects of sarecycline.  All of the patient's questions and concerns were addressed. Benzoyl Peroxide Counseling: Patient counseled that medicine may cause skin irritation and bleach clothing.  In the event of skin irritation, the patient was advised to reduce the amount of the drug applied or use it less frequently.   The patient verbalized understanding of the proper use and possible adverse effects of benzoyl peroxide.  All of the patient's questions and concerns were addressed. Bactrim Pregnancy And Lactation Text: This medication is Pregnancy Category D and is known to cause fetal risk.  It is also excreted in breast milk. Cyclophosphamide Counseling:  I discussed with the patient the risks of cyclophosphamide including but not limited to hair loss, hormonal abnormalities, decreased fertility, abdominal pain, diarrhea, nausea and vomiting, bone marrow suppression and infection. The patient understands that monitoring is required while taking this medication. Enbrel Counseling:  I discussed with the patient the risks of etanercept including but not limited to myelosuppression, immunosuppression, autoimmune hepatitis, demyelinating diseases, lymphoma, and infections.  The patient understands that monitoring is required including a PPD at baseline and must alert us or the primary physician if symptoms of infection or other concerning signs are noted. Skyrizi Counseling: I discussed with the patient the risks of risankizumab-rzaa including but not limited to immunosuppression, and serious infections.  The patient understands that monitoring is required including a PPD at baseline and must alert us or the primary physician if symptoms of infection or other concerning signs are noted. Doxepin Pregnancy And Lactation Text: This medication is Pregnancy Category C and it isn't known if it is safe during pregnancy. It is also excreted in breast milk and breast feeding isn't recommended. Picato Counseling:  I discussed with the patient the risks of Picato including but not limited to erythema, scaling, itching, weeping, crusting, and pain. Cephalexin Counseling: I counseled the patient regarding use of cephalexin as an antibiotic for prophylactic and/or therapeutic purposes. Cephalexin (commonly prescribed under brand name Keflex) is a cephalosporin antibiotic which is active against numerous classes of bacteria, including most skin bacteria. Side effects may include nausea, diarrhea, gastrointestinal upset, rash, hives, yeast infections, and in rare cases, hepatitis, kidney disease, seizures, fever, confusion, neurologic symptoms, and others. Patients with severe allergies to penicillin medications are cautioned that there is about a 10% incidence of cross-reactivity with cephalosporins. When possible, patients with penicillin allergies should use alternatives to cephalosporins for antibiotic therapy. Tetracycline Counseling: Patient counseled regarding possible photosensitivity and increased risk for sunburn.  Patient instructed to avoid sunlight, if possible.  When exposed to sunlight, patients should wear protective clothing, sunglasses, and sunscreen.  The patient was instructed to call the office immediately if the following severe adverse effects occur:  hearing changes, easy bruising/bleeding, severe headache, or vision changes.  The patient verbalized understanding of the proper use and possible adverse effects of tetracycline.  All of the patient's questions and concerns were addressed. Patient understands to avoid pregnancy while on therapy due to potential birth defects. Benzoyl Peroxide Pregnancy And Lactation Text: This medication is Pregnancy Category C. It is unknown if benzoyl peroxide is excreted in breast milk. Cyclophosphamide Pregnancy And Lactation Text: This medication is Pregnancy Category D and it isn't considered safe during pregnancy. This medication is excreted in breast milk. Hydroxyzine Counseling: Patient advised that the medication is sedating and not to drive a car after taking this medication.  Patient informed of potential adverse effects including but not limited to dry mouth, urinary retention, and blurry vision.  The patient verbalized understanding of the proper use and possible adverse effects of hydroxyzine.  All of the patient's questions and concerns were addressed. Otezla Counseling: The side effects of Otezla were discussed with the patient, including but not limited to worsening or new depression, weight loss, diarrhea, nausea, upper respiratory tract infection, and headache. Patient instructed to call the office should any adverse effect occur.  The patient verbalized understanding of the proper use and possible adverse effects of Otezla.  All the patient's questions and concerns were addressed. Humira Counseling:  I discussed with the patient the risks of adalimumab including but not limited to myelosuppression, immunosuppression, autoimmune hepatitis, demyelinating diseases, lymphoma, and serious infections.  The patient understands that monitoring is required including a PPD at baseline and must alert us or the primary physician if symptoms of infection or other concerning signs are noted. Clofazimine Counseling:  I discussed with the patient the risks of clofazimine including but not limited to skin and eye pigmentation, liver damage, nausea/vomiting, gastrointestinal bleeding and allergy. Carac Counseling:  I discussed with the patient the risks of Carac including but not limited to erythema, scaling, itching, weeping, crusting, and pain. Cyclosporine Counseling:  I discussed with the patient the risks of cyclosporine including but not limited to hypertension, gingival hyperplasia,myelosuppression, immunosuppression, liver damage, kidney damage, neurotoxicity, lymphoma, and serious infections. The patient understands that monitoring is required including baseline blood pressure, CBC, CMP, lipid panel and uric acid, and then 1-2 times monthly CMP and blood pressure. Protopic Counseling: Patient may experience a mild burning sensation during topical application. Protopic is not approved in children less than 2 years of age. There have been case reports of hematologic and skin malignancies in patients using topical calcineurin inhibitors although causality is questionable. Cephalexin Pregnancy And Lactation Text: This medication is Pregnancy Category B and considered safe during pregnancy.  It is also excreted in breast milk but can be used safely for shorter doses. Detail Level: Detailed Stelara Counseling:  I discussed with the patient the risks of ustekinumab including but not limited to immunosuppression, malignancy, posterior leukoencephalopathy syndrome, and serious infections.  The patient understands that monitoring is required including a PPD at baseline and must alert us or the primary physician if symptoms of infection or other concerning signs are noted. Carac Pregnancy And Lactation Text: This medication is Pregnancy Category X and contraindicated in pregnancy and in women who may become pregnant. It is unknown if this medication is excreted in breast milk. Clindamycin Counseling: I counseled the patient regarding use of clindamycin as an antibiotic for prophylactic and/or therapeutic purposes. Clindamycin is active against numerous classes of bacteria, including skin bacteria. Side effects may include nausea, diarrhea, gastrointestinal upset, rash, hives, yeast infections, and in rare cases, colitis. Otezla Pregnancy And Lactation Text: This medication is Pregnancy Category C and it isn't known if it is safe during pregnancy. It is unknown if it is excreted in breast milk. Cyclosporine Pregnancy And Lactation Text: This medication is Pregnancy Category C and it isn't know if it is safe during pregnancy. This medication is excreted in breast milk. Hydroxyzine Pregnancy And Lactation Text: This medication is not safe during pregnancy and should not be taken. It is also excreted in breast milk and breast feeding isn't recommended. Protopic Pregnancy And Lactation Text: This medication is Pregnancy Category C. It is unknown if this medication is excreted in breast milk when applied topically. 5-Fu Counseling: 5-Fluorouracil Counseling:  I discussed with the patient the risks of 5-fluorouracil including but not limited to erythema, scaling, itching, weeping, crusting, and pain. Ilumya Counseling: I discussed with the patient the risks of tildrakizumab including but not limited to immunosuppression, malignancy, posterior leukoencephalopathy syndrome, and serious infections.  The patient understands that monitoring is required including a PPD at baseline and must alert us or the primary physician if symptoms of infection or other concerning signs are noted. Colchicine Counseling:  Patient counseled regarding adverse effects including but not limited to stomach upset (nausea, vomiting, stomach pain, or diarrhea).  Patient instructed to limit alcohol consumption while taking this medication.  Colchicine may reduce blood counts especially with prolonged use.  The patient understands that monitoring of kidney function and blood counts may be required, especially at baseline. The patient verbalized understanding of the proper use and possible adverse effects of colchicine.  All of the patient's questions and concerns were addressed. Methotrexate Counseling:  Patient counseled regarding adverse effects of methotrexate including but not limited to nausea, vomiting, abnormalities in liver function tests. Patients may develop mouth sores, rash, diarrhea, and abnormalities in blood counts. The patient understands that monitoring is required including LFT's and blood counts.  There is a rare possibility of scarring of the liver and lung problems that can occur when taking methotrexate. Persistent nausea, loss of appetite, pale stools, dark urine, cough, and shortness of breath should be reported immediately. Patient advised to discontinue methotrexate treatment at least three months before attempting to become pregnant.  I discussed the need for folate supplements while taking methotrexate.  These supplements can decrease side effects during methotrexate treatment. The patient verbalized understanding of the proper use and possible adverse effects of methotrexate.  All of the patient's questions and concerns were addressed. Oxybutynin Counseling:  I discussed with the patient the risks of oxybutynin including but not limited to skin rash, drowsiness, dry mouth, difficulty urinating, and blurred vision. Albendazole Counseling:  I discussed with the patient the risks of albendazole including but not limited to cytopenia, kidney damage, nausea/vomiting and severe allergy.  The patient understands that this medication is being used in an off-label manner. Solaraze Counseling:  I discussed with the patient the risks of Solaraze including but not limited to erythema, scaling, itching, weeping, crusting, and pain. Taltz Counseling: I discussed with the patient the risks of ixekizumab including but not limited to immunosuppression, serious infections, worsening of inflammatory bowel disease and drug reactions.  The patient understands that monitoring is required including a PPD at baseline and must alert us or the primary physician if symptoms of infection or other concerning signs are noted. Clindamycin Pregnancy And Lactation Text: This medication can be used in pregnancy if certain situations. Clindamycin is also present in breast milk. Fluconazole Counseling:  Patient counseled regarding adverse effects of fluconazole including but not limited to headache, diarrhea, nausea, upset stomach, liver function test abnormalities, taste disturbance, and stomach pain.  There is a rare possibility of liver failure that can occur when taking fluconazole.  The patient understands that monitoring of LFTs and kidney function test may be required, especially at baseline. The patient verbalized understanding of the proper use and possible adverse effects of fluconazole.  All of the patient's questions and concerns were addressed. Doxycycline Counseling:  Patient counseled regarding possible photosensitivity and increased risk for sunburn.  Patient instructed to avoid sunlight, if possible.  When exposed to sunlight, patients should wear protective clothing, sunglasses, and sunscreen.  The patient was instructed to call the office immediately if the following severe adverse effects occur:  hearing changes, easy bruising/bleeding, severe headache, or vision changes.  The patient verbalized understanding of the proper use and possible adverse effects of doxycycline.  All of the patient's questions and concerns were addressed. Methotrexate Pregnancy And Lactation Text: This medication is Pregnancy Category X and is known to cause fetal harm. This medication is excreted in breast milk. Dapsone Counseling: I discussed with the patient the risks of dapsone including but not limited to hemolytic anemia, agranulocytosis, rashes, methemoglobinemia, kidney failure, peripheral neuropathy, headaches, GI upset, and liver toxicity.  Patients who start dapsone require monitoring including baseline LFTs and weekly CBCs for the first month, then every month thereafter.  The patient verbalized understanding of the proper use and possible adverse effects of dapsone.  All of the patient's questions and concerns were addressed. Solaraze Pregnancy And Lactation Text: This medication is Pregnancy Category B and is considered safe. There is some data to suggest avoiding during the third trimester. It is unknown if this medication is excreted in breast milk. Doxycycline Pregnancy And Lactation Text: This medication is Pregnancy Category D and not consider safe during pregnancy. It is also excreted in breast milk but is considered safe for shorter treatment courses. Topical Retinoid counseling:  Patient advised to apply a pea-sized amount only at bedtime and wait 30 minutes after washing their face before applying.  If too drying, patient may add a non-comedogenic moisturizer. The patient verbalized understanding of the proper use and possible adverse effects of retinoids.  All of the patient's questions and concerns were addressed. Dapsone Pregnancy And Lactation Text: This medication is Pregnancy Category C and is not considered safe during pregnancy or breast feeding. Prednisone Counseling:  I discussed with the patient the risks of prolonged use of prednisone including but not limited to weight gain, insomnia, osteoporosis, mood changes, diabetes, susceptibility to infection, glaucoma and high blood pressure.  In cases where prednisone use is prolonged, patients should be monitored with blood pressure checks, serum glucose levels and an eye exam.  Additionally, the patient may need to be placed on GI prophylaxis, PCP prophylaxis, and calcium and vitamin D supplementation and/or a bisphosphonate.  The patient verbalized understanding of the proper use and the possible adverse effects of prednisone.  All of the patient's questions and concerns were addressed. Griseofulvin Counseling:  I discussed with the patient the risks of griseofulvin including but not limited to photosensitivity, cytopenia, liver damage, nausea/vomiting and severe allergy.  The patient understands that this medication is best absorbed when taken with a fatty meal (e.g., ice cream or french fries). Drysol Counseling:  I discussed with the patient the risks of drysol/aluminum chloride including but not limited to skin rash, itching, irritation, burning. Birth Control Pills Counseling: Birth Control Pill Counseling: I discussed with the patient the potential side effects of OCPs including but not limited to increased risk of stroke, heart attack, thrombophlebitis, deep venous thrombosis, hepatic adenomas, breast changes, GI upset, headaches, and depression.  The patient verbalized understanding of the proper use and possible adverse effects of OCPs. All of the patient's questions and concerns were addressed. Infliximab Counseling:  I discussed with the patient the risks of infliximab including but not limited to myelosuppression, immunosuppression, autoimmune hepatitis, demyelinating diseases, lymphoma, and serious infections.  The patient understands that monitoring is required including a PPD at baseline and must alert us or the primary physician if symptoms of infection or other concerning signs are noted. Ivermectin Counseling:  Patient instructed to take medication on an empty stomach with a full glass of water.  Patient informed of potential adverse effects including but not limited to nausea, diarrhea, dizziness, itching, and swelling of the extremities or lymph nodes.  The patient verbalized understanding of the proper use and possible adverse effects of ivermectin.  All of the patient's questions and concerns were addressed. Birth Control Pills Pregnancy And Lactation Text: This medication should be avoided if pregnant and for the first 30 days post-partum. Tremfya Counseling: I discussed with the patient the risks of guselkumab including but not limited to immunosuppression, serious infections, worsening of inflammatory bowel disease and drug reactions.  The patient understands that monitoring is required including a PPD at baseline and must alert us or the primary physician if symptoms of infection or other concerning signs are noted. Drysol Pregnancy And Lactation Text: This medication is considered safe during pregnancy and breast feeding. Griseofulvin Pregnancy And Lactation Text: This medication is Pregnancy Category X and is known to cause serious birth defects. It is unknown if this medication is excreted in breast milk but breast feeding should be avoided. Erythromycin Counseling:  I discussed with the patient the risks of erythromycin including but not limited to GI upset, allergic reaction, drug rash, diarrhea, increase in liver enzymes, and yeast infections.

## 2023-08-08 ENCOUNTER — OFFICE VISIT (OUTPATIENT)
Dept: PRIMARY CARE | Facility: CLINIC | Age: 48
End: 2023-08-08
Payer: COMMERCIAL

## 2023-08-08 VITALS
HEART RATE: 80 BPM | DIASTOLIC BLOOD PRESSURE: 82 MMHG | OXYGEN SATURATION: 98 % | HEIGHT: 72 IN | SYSTOLIC BLOOD PRESSURE: 117 MMHG | BODY MASS INDEX: 24.57 KG/M2 | WEIGHT: 181.4 LBS

## 2023-08-08 DIAGNOSIS — E03.9 ACQUIRED HYPOTHYROIDISM: ICD-10-CM

## 2023-08-08 DIAGNOSIS — M79.605 PAIN OF LEFT LOWER EXTREMITY: ICD-10-CM

## 2023-08-08 DIAGNOSIS — F17.210 CIGARETTE NICOTINE DEPENDENCE WITHOUT COMPLICATION: ICD-10-CM

## 2023-08-08 DIAGNOSIS — R76.8 POSITIVE ANA (ANTINUCLEAR ANTIBODY): ICD-10-CM

## 2023-08-08 DIAGNOSIS — I73.00 RAYNAUD'S PHENOMENON WITHOUT GANGRENE: Primary | ICD-10-CM

## 2023-08-08 PROCEDURE — 99214 OFFICE O/P EST MOD 30 MIN: CPT | Performed by: REGISTERED NURSE

## 2023-08-08 RX ORDER — IBUPROFEN 200 MG
1 TABLET ORAL EVERY 24 HOURS
Qty: 30 PATCH | Refills: 0 | Status: SHIPPED | OUTPATIENT
Start: 2023-08-08 | End: 2023-09-07

## 2023-08-08 NOTE — PATIENT INSTRUCTIONS
Rheumatology:  Mookie Pascual [phone]  Daniella Mendez () [phone]  Khushi Stephens () [phone]

## 2023-12-29 DIAGNOSIS — I73.00 RAYNAUD'S DISEASE WITHOUT GANGRENE: ICD-10-CM

## 2023-12-29 RX ORDER — AMLODIPINE BESYLATE 5 MG/1
TABLET ORAL
Qty: 90 TABLET | Refills: 1 | Status: SHIPPED | OUTPATIENT
Start: 2023-12-29 | End: 2024-03-26

## 2024-03-26 DIAGNOSIS — E03.9 ACQUIRED HYPOTHYROIDISM: ICD-10-CM

## 2024-03-26 DIAGNOSIS — I73.00 RAYNAUD'S DISEASE WITHOUT GANGRENE: ICD-10-CM

## 2024-03-26 RX ORDER — LEVOTHYROXINE SODIUM 100 UG/1
100 TABLET ORAL
Qty: 90 TABLET | Refills: 0 | Status: SHIPPED | OUTPATIENT
Start: 2024-03-26 | End: 2024-05-14

## 2024-03-26 RX ORDER — AMLODIPINE BESYLATE 5 MG/1
5 TABLET ORAL DAILY
Qty: 90 TABLET | Refills: 1 | Status: SHIPPED | OUTPATIENT
Start: 2024-03-26

## 2024-05-14 DIAGNOSIS — E03.9 ACQUIRED HYPOTHYROIDISM: ICD-10-CM

## 2024-05-14 RX ORDER — LEVOTHYROXINE SODIUM 100 UG/1
100 TABLET ORAL DAILY
Qty: 90 TABLET | Refills: 3 | Status: SHIPPED | OUTPATIENT
Start: 2024-05-14

## 2024-06-19 ENCOUNTER — LAB (OUTPATIENT)
Dept: LAB | Facility: LAB | Age: 49
End: 2024-06-19
Payer: COMMERCIAL

## 2024-06-19 DIAGNOSIS — E03.9 ACQUIRED HYPOTHYROIDISM: ICD-10-CM

## 2024-06-19 LAB — TSH SERPL-ACNC: 15.31 MIU/L (ref 0.44–3.98)

## 2024-06-19 PROCEDURE — 84443 ASSAY THYROID STIM HORMONE: CPT

## 2024-06-19 PROCEDURE — 84439 ASSAY OF FREE THYROXINE: CPT

## 2024-06-19 PROCEDURE — 36415 COLL VENOUS BLD VENIPUNCTURE: CPT

## 2024-06-20 PROBLEM — E66.9 OBESITY: Status: ACTIVE | Noted: 2024-06-20

## 2024-06-20 LAB — T4 FREE SERPL-MCNC: 0.87 NG/DL (ref 0.61–1.12)

## 2024-06-20 NOTE — PROGRESS NOTES
Subjective   Patient ID: Sanford Jones is a 48 y.o. male who presents for Follow-up (Pressure on bottom of rub cage, upper stomach area, very uncomfortable, would like a referral for GI for diverticulosis, ).    HPI     When he is working and bending over, he is getting pressure in his upper abdomen area. No pain with palpation, more with movement.   More on his left side, feels like under his skin ins crawling. Happens with bending forward, The longer he bends over the longer it lasts. Pressure, no pain. Due for colonoscopy and egd. Would like to get back in with GI.     Has been taking a mushroom supplement.     Exercising more, eating healthy     Back pain: Pulled a muscle on left side, having a lot of pain on that side. Has had issues with this multiple times over the past 10 years. Has times where it will just lock up. Behind shoulder blade, wife has massaged it, tried heat. Lasts about 3-4 days and then it goes away.      Saw cardiology and they told him that he does have raynaud's and that he had a positive LYNETTE. He is going to see Rheumatology, information provided for Dr. Mendez. (Going July 12th)      Hot tingling behind the knee. Goes down behind. Drinking more water helps with his pain in his toes.     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 This is keeping him up at night. Sneezing, coughing.      Hypothyroidism: Increased levothyroxine to 100mcg last time, Doing well on higher dose.      Autoimmune: he saw Dr. Mendez had blood work done, his anti-ssa and anti-ssb were >8 and Thyroid Peroxidase was >1000     Nicotine Dependence: He is a smoker and had quit for 3 years, with his new job he started smoking again. He has quit off and on multiple times. He has been thinking about quitting.   Would like to stop smoking: He tried cold turkey. He smokes when he is stressed, but sometimes only uses the cigarettes as a habit and does not actually smoke it. Discussed  options like gum and patches. Currently about 0.5-1ppd. Depending on the day   Gum made him nauseated. Would like to try patches now     He is vaping, not inhaling, using only 2.5% now. Doing well.      GERD- Controlled on medication Pantoprazole 40mg twice daily      All other systems reviewed and negative for complaint unless stated above.    Review of Systems   Constitutional:  Negative for chills and fever.   HENT:  Negative for congestion, ear pain and rhinorrhea.    Eyes:  Negative for discharge and redness.   Respiratory:  Negative for cough, shortness of breath and wheezing.    Cardiovascular:  Negative for chest pain and leg swelling.   Gastrointestinal:  Negative for abdominal pain, constipation, diarrhea, nausea and vomiting.   Genitourinary:  Negative for difficulty urinating, frequency and urgency.   Musculoskeletal:  Negative for gait problem.   Skin:  Negative for rash and wound.   Neurological:  Negative for dizziness, weakness and headaches.   Psychiatric/Behavioral:  Negative for confusion. The patient is not nervous/anxious.        Objective   BP (!) 134/93 (BP Location: Right arm, Patient Position: Sitting, BP Cuff Size: Large adult)   Pulse 77   Wt 89.1 kg (196 lb 5.4 oz)   BMI 26.63 kg/m²     Physical Exam  Vitals reviewed.   Constitutional:       Appearance: Normal appearance.   HENT:      Head: Normocephalic.      Right Ear: Tympanic membrane, ear canal and external ear normal.      Left Ear: Tympanic membrane, ear canal and external ear normal.      Nose: No rhinorrhea.      Mouth/Throat:      Mouth: Mucous membranes are moist.      Pharynx: Oropharynx is clear.   Eyes:      Pupils: Pupils are equal, round, and reactive to light.   Cardiovascular:      Rate and Rhythm: Normal rate and regular rhythm.      Pulses: Normal pulses.   Pulmonary:      Effort: Pulmonary effort is normal.      Breath sounds: Normal breath sounds.   Abdominal:      General: Abdomen is flat. Bowel sounds are  normal.      Palpations: Abdomen is soft.   Musculoskeletal:         General: No tenderness. Normal range of motion.      Right lower leg: No edema.      Left lower leg: No edema.   Lymphadenopathy:      Cervical: No cervical adenopathy.   Skin:     General: Skin is warm and dry.      Findings: No rash.   Neurological:      Mental Status: He is alert and oriented to person, place, and time.   Psychiatric:         Mood and Affect: Mood normal.         Behavior: Behavior normal.       Assessment/Plan   Diagnoses and all orders for this visit:  Cigarette nicotine dependence without complication  Diverticulosis  -     Referral to Gastroenterology; Future  Acquired hypothyroidism  -     levothyroxine (Synthroid, Levoxyl) 125 mcg tablet; Take 1 tablet (125 mcg) by mouth once daily.  -     TSH with reflex to Free T4 if abnormal; Future    #nicotine dependence   Quit a month ago  Vaping and titration back nicotine      #allergies  Take daily medication   Can take benadryl for acute episodes   Blood work ordered      #Hypothyroidism   Increased to 125mcg  TSH check in 6-8 weeks      #Positive LYNETTE  #Raynauds  Referral for rhumatology   Getting in with Dr. Mendez   May try calcium channel blocker in the future   Amplodipine 10mg   Having headaches.   Lower back to 5mg to see if this helps     #Muscle cramps  #Leg pain   Rx cyclobenzaprine   naproxen is not that good anymore  Trial meloxicam   may try diclofenac next   encouraged hydration   Going to PT      #GERD  #Diverticulosis  on pantoprazole  no acute issues     #BMI 35   encourage diet and exercise   following with GI at [facility]      #HCM  Colonoscopy: Done within the past 2 years

## 2024-06-24 ENCOUNTER — APPOINTMENT (OUTPATIENT)
Dept: PRIMARY CARE | Facility: CLINIC | Age: 49
End: 2024-06-24
Payer: COMMERCIAL

## 2024-06-24 VITALS
BODY MASS INDEX: 26.63 KG/M2 | WEIGHT: 196.34 LBS | DIASTOLIC BLOOD PRESSURE: 93 MMHG | HEART RATE: 77 BPM | SYSTOLIC BLOOD PRESSURE: 134 MMHG

## 2024-06-24 DIAGNOSIS — K57.90 DIVERTICULOSIS: ICD-10-CM

## 2024-06-24 DIAGNOSIS — F17.210 CIGARETTE NICOTINE DEPENDENCE WITHOUT COMPLICATION: Primary | ICD-10-CM

## 2024-06-24 DIAGNOSIS — E03.9 ACQUIRED HYPOTHYROIDISM: ICD-10-CM

## 2024-06-24 PROCEDURE — 99214 OFFICE O/P EST MOD 30 MIN: CPT | Performed by: REGISTERED NURSE

## 2024-06-24 RX ORDER — LEVOTHYROXINE SODIUM 125 UG/1
125 TABLET ORAL DAILY
Qty: 90 TABLET | Refills: 3 | Status: SHIPPED | OUTPATIENT
Start: 2024-06-24 | End: 2025-06-24

## 2024-06-24 ASSESSMENT — ENCOUNTER SYMPTOMS
DIZZINESS: 0
VOMITING: 0
EYE REDNESS: 0
RHINORRHEA: 0
NAUSEA: 0
WHEEZING: 0
FREQUENCY: 0
ABDOMINAL PAIN: 0
EYE DISCHARGE: 0
FEVER: 0
CONFUSION: 0
HEADACHES: 0
WEAKNESS: 0
CONSTIPATION: 0
DIFFICULTY URINATING: 0
NERVOUS/ANXIOUS: 0
COUGH: 0
DIARRHEA: 0
CHILLS: 0
WOUND: 0
SHORTNESS OF BREATH: 0

## 2024-07-16 ENCOUNTER — APPOINTMENT (OUTPATIENT)
Dept: GASTROENTEROLOGY | Facility: CLINIC | Age: 49
End: 2024-07-16
Payer: COMMERCIAL

## 2024-07-16 VITALS
SYSTOLIC BLOOD PRESSURE: 122 MMHG | HEART RATE: 87 BPM | HEIGHT: 72 IN | DIASTOLIC BLOOD PRESSURE: 73 MMHG | BODY MASS INDEX: 26.14 KG/M2 | TEMPERATURE: 97.3 F | WEIGHT: 193 LBS | RESPIRATION RATE: 18 BRPM | OXYGEN SATURATION: 97 %

## 2024-07-16 DIAGNOSIS — K57.90 DIVERTICULOSIS: ICD-10-CM

## 2024-07-16 DIAGNOSIS — K21.9 GASTROESOPHAGEAL REFLUX DISEASE WITHOUT ESOPHAGITIS: Primary | ICD-10-CM

## 2024-07-16 DIAGNOSIS — K57.32 DIVERTICULITIS OF COLON: ICD-10-CM

## 2024-07-16 PROCEDURE — 3008F BODY MASS INDEX DOCD: CPT

## 2024-07-16 PROCEDURE — 99204 OFFICE O/P NEW MOD 45 MIN: CPT

## 2024-07-16 RX ORDER — PANTOPRAZOLE SODIUM 40 MG/1
40 TABLET, DELAYED RELEASE ORAL
Qty: 180 TABLET | Refills: 3 | Status: SHIPPED | OUTPATIENT
Start: 2024-07-16 | End: 2025-07-16

## 2024-07-16 ASSESSMENT — PATIENT HEALTH QUESTIONNAIRE - PHQ9
2. FEELING DOWN, DEPRESSED OR HOPELESS: NOT AT ALL
SUM OF ALL RESPONSES TO PHQ9 QUESTIONS 1 AND 2: 0
1. LITTLE INTEREST OR PLEASURE IN DOING THINGS: NOT AT ALL

## 2024-07-16 ASSESSMENT — ENCOUNTER SYMPTOMS
BLOOD IN STOOL: 0
FATIGUE: 0
DIAPHORESIS: 0
DIARRHEA: 0
VOMITING: 0
RECTAL PAIN: 0
ANAL BLEEDING: 0
UNEXPECTED WEIGHT CHANGE: 0
FEVER: 0
CONSTIPATION: 0
ABDOMINAL PAIN: 1
NAUSEA: 0
ABDOMINAL DISTENTION: 1
ROS GI COMMENTS: SEE HPI
APPETITE CHANGE: 0
CHILLS: 0

## 2024-07-16 ASSESSMENT — PAIN SCALES - GENERAL: PAINLEVEL: 4

## 2024-07-16 NOTE — ASSESSMENT & PLAN NOTE
Patient reports recent diverticulitis flare, last colonoscopy was over 3 years ago  He has a history of sessile serrated polyp  Orders:    Colonoscopy Diagnostic; Future

## 2024-07-16 NOTE — PATIENT INSTRUCTIONS
Start famotidine 20 mg twice daily as needed for GERD symptoms- Buy OTC  EGD with [facility]  Colonoscopy  Continue your pantoprazole twice daily  Follow up after your EGD/Colonoscopy/[facility]

## 2024-07-16 NOTE — PROGRESS NOTES
History Of Present Illness  Sanford Jones is a 49 y.o. male presenting to GI clinic with a chief complaint of GERD and diverticulitis.    Patient states he has LUQ pain and bloating off and on. He has a lot of pressure and a crawling sensation under his skin to his left upper quadrant for minutes-hours; It will go away for minutes to hours, and then it flares again. A week or two ago, he felt it in bed, pressed on it hard and had pain for about 20 mins after.     Patient reports change in bowel movements.  He is having 2-4 bms per day, more frequent bms than normal over the last 8 months.  BMS were only daily before. He denies BRBPR, melena, hematochezia. Stool is still formed, soft.    His acid reflux has slowly gotten worse over the last few years and it will get so bad that he begins to gag.  He does not eat after 6 PM and he needs to sleep upright due to symptoms. He does not use OTC antacids for symptoms. He is taking Pantoprazole twice daily. Patient reports a history of gastric ulcer.    Patient reports a history of diverticulitis; his last flare was approximately 2 months ago.  He has a specific left lower quadrant pain associated with diverticulitis flares. Does well with amoxi-clauv.  His last diverticulitis flare was 3 or so years ago. He has been trying to improve his diverticulosis to prevent flares of diverticulitis.  He quit smoking, is now exercising, eating more salads and vegetables for approximately 6 months now.     Social History  He reports that he has quit smoking. His smoking use included cigarettes. He has been exposed to tobacco smoke. He has quit using smokeless tobacco. He reports current alcohol use. He reports that he does not use drugs.  He does not take NSAIDs on a regular basis. He is vaping nicotiine.    Family History  Family History   Problem Relation Name Age of Onset    Diabetes type II Father      Long QT syndrome Son      Other (monoallelic deletion in KCNQ1 gene) Son       Other (monoallelic mutation of [id number]) Son      Other (nonalcoholic steatohepatitis) Son      Diabetes Son      Diabetes type II Paternal Grandfather       The patient does not have a FH of CRC. he does not have a FH of IBD    Review of Systems   Constitutional:  Negative for appetite change, chills, diaphoresis, fatigue, fever and unexpected weight change.   Gastrointestinal:  Positive for abdominal distention and abdominal pain. Negative for anal bleeding, blood in stool, constipation, diarrhea, nausea, rectal pain and vomiting.        See HPI   All other systems reviewed and are negative.        Physical Exam  HENT:      Head: Normocephalic and atraumatic.   Eyes:      Conjunctiva/sclera: Conjunctivae normal.      Pupils: Pupils are equal, round, and reactive to light.   Pulmonary:      Effort: Pulmonary effort is normal.   Abdominal:      General: Bowel sounds are normal. There is no distension.      Palpations: Abdomen is soft. There is no mass.      Tenderness: There is no abdominal tenderness. There is no guarding or rebound.      Hernia: No hernia is present.   Musculoskeletal:         General: Normal range of motion.      Cervical back: Normal range of motion.   Skin:     General: Skin is warm and dry.      Coloration: Skin is not jaundiced.   Neurological:      Mental Status: He is alert and oriented to person, place, and time. Mental status is at baseline.   Psychiatric:         Mood and Affect: Mood normal.         Behavior: Behavior normal.          Last Vital Signs  /73 (BP Location: Left arm, Patient Position: Sitting, BP Cuff Size: Adult)   Pulse 87   Temp 36.3 °C (97.3 °F)   Resp 18   Ht 1.829 m (6')   Wt 87.5 kg (193 lb)   SpO2 97%   BMI 26.18 kg/m²      Relevant Results  Lab Results   Component Value Date    WBC 9.2 07/12/2023    HGB 14.1 07/12/2023    HCT 41.6 07/12/2023    MCV 87 07/12/2023     07/12/2023      Lab Results   Component Value Date    GLUCOSE 108 (H)  "02/04/2023    CALCIUM 9.1 02/04/2023     02/04/2023    K 4.2 02/04/2023    CO2 28 02/04/2023     02/04/2023    BUN 16 02/04/2023    CREATININE 0.95 02/04/2023      Lab Results   Component Value Date    ALT 58 (H) 02/04/2023    AST 34 02/04/2023    ALKPHOS 74 02/04/2023    BILITOT 0.4 02/04/2023    INR 1.0 02/04/2023      Lab Results   Component Value Date    MVFMITHN11 409 05/03/2022       Lab Results   Component Value Date    CRP 0.41 02/23/2023    No results found for: \"LIPASE\"   Lab Results   Component Value Date    HGBA1C 6.6 (H) 03/26/2022    HGBA1C 6.4 03/02/2022        EGD/COLONOSCOPY  EGD 4/9/2021 with Dr. Arreola-mild antral gastritis, no H. pylori noted on biopsies, small sliding hiatal hernia.  Normal esophagus, normal duodenum to D3    Colonoscopy 4/9/2021 with Dr. Arreola- normal terminal ileum, diminutive cecal tubular adenoma, scattered diverticulosis, more extensive diverticulosis throughout sigmoid colon.  Prolapsed mucosa noted in several segments of sigmoid colon along with punctate erythema in the folds within sigmoid colon around diverticulum, no signs of acute diverticulitis.    EGD 12/27/2018- Impression:            - LA Grade B reflux esophagitis. Biopsied.   - 4 cm hiatal hernia.                        - Normal examined duodenum.                        - Gastritis. Biopsied.     Colonoscopy 12/27/2018 with Dr. Pacheco-Impression:            - One 4 mm polyp at the recto-sigmoid colon, removed with a cold snare. Resected and retrieved.   - Moderate diverticulosis in the sigmoid colon, in the descending colon and in the cecum. There was narrowing of the colon in association with the diverticular opening. There was evidence of an impacted diverticulum. Petechia were visualized in association with the diverticular opening. There was no evidence of diverticular bleeding.   - The examined portion of the ileum was normal.     1. Gastric antrum/body, biopsy (A) - Oxyntic and antral   mucosa " with no significant diagnostic alteration. - No morphologic evidence   of Helicobacter pylori organisms.     2. Distal esophagus, biopsy (B) -   Esophagitis with fungal elements morphologically consistent with Candida   species and admixed bacterial colonies. - Scant glandular epithelium,   negative for intestinal metaplasia.    3. Rectal, sigmoid polyp, biopsy (C) -   Sessile serrated polyp.      IMAGING  CT ABDOMEN PELVIS W IV CONTRAST 3/2/2021  IMPRESSION:  Diffuse colonic diverticulosis and suspect mild diverticulitis distal  descending colon close to its junction with sigmoid colon.  No free intraperitoneal air or abscess formation.  Diffuse hepatic steatosis.    THIS REPORT NEEDS PHYSICIAN'S ATTENTION.  REPORT TO BE CALLED TO 'S OFFICE.    === 05/27/16 ===  CT ABDOMEN PELVIS W CONTRAST  - Impression -  No evidence of an acute appendicitis.    Extensive sigmoid diverticulosis is seen without definite acute  diverticulitis.  There is resolution of previously visualized  pericolonic stranding.  Slight increase in vascularity of the  proximal sigmoid colon is seen which is likely a normal finding, but  if patient has persistent pain, recommend follow-up CT examination to  exclude developing diverticulitis.    Apparent thickening of the urinary bladder wall may be secondary to  partial decompressed status.  However, recommend correlation with  urinalysis to exclude cystitis.    === 06/13/12 ===  CT ABDOMEN PELVIS W CONTRAST  - Impression -  1. Slight interval improvement in wall thickening and pericolonic inflammatory stranding involving the mid sigmoid colon, consistent in appearance with acute diverticulitis.    2. No evidence of perforation or abscess formation.    Assessment & Plan  Diverticulosis  Continue working on lifestyle interventions, exercise, high-fiber diet  Orders:    Referral to Gastroenterology    Colonoscopy Diagnostic; Future    Gastroesophageal reflux disease without esophagitis  Continue  PPI twice daily  Still having worsened acid reflux, needs to sleep upright due to symptoms.  He has a history of hiatal hernia.  Plan for EGD with [facility] for further evaluation of symptoms  Patient to start famotidine 20 mg twice daily as needed with his PPI  Orders:    pantoprazole (ProtoNix) 40 mg EC tablet; Take 1 tablet (40 mg) by mouth 2 times a day before meals.    Esophagogastroduodenoscopy (EGD); Future    BRAVO; Future    Diverticulitis of colon  Patient reports recent diverticulitis flare, last colonoscopy was over 3 years ago  He has a history of sessile serrated polyp  Orders:    Colonoscopy Diagnostic; Future         Adry Myers, APRN-CNP

## 2024-07-16 NOTE — ASSESSMENT & PLAN NOTE
Continue working on lifestyle interventions, exercise, high-fiber diet  Orders:    Referral to Gastroenterology    Colonoscopy Diagnostic; Future

## 2024-07-16 NOTE — ASSESSMENT & PLAN NOTE
Continue PPI twice daily  Still having worsened acid reflux, needs to sleep upright due to symptoms.  He has a history of hiatal hernia.  Plan for EGD with [facility] for further evaluation of symptoms  Patient to start famotidine 20 mg twice daily as needed with his PPI  Orders:    pantoprazole (ProtoNix) 40 mg EC tablet; Take 1 tablet (40 mg) by mouth 2 times a day before meals.    Esophagogastroduodenoscopy (EGD); Future    BRAVO; Future

## 2024-08-05 DIAGNOSIS — Z12.11 SCREENING FOR MALIGNANT NEOPLASM OF COLON PERFORMED: ICD-10-CM

## 2024-08-05 PROCEDURE — RXMED WILLOW AMBULATORY MEDICATION CHARGE

## 2024-08-05 RX ORDER — SODIUM, POTASSIUM,MAG SULFATES 17.5-3.13G
SOLUTION, RECONSTITUTED, ORAL ORAL
Qty: 354 ML | Refills: 0 | Status: SHIPPED | OUTPATIENT
Start: 2024-08-05

## 2024-08-06 ENCOUNTER — PHARMACY VISIT (OUTPATIENT)
Dept: PHARMACY | Facility: CLINIC | Age: 49
End: 2024-08-06
Payer: COMMERCIAL

## 2024-08-20 ENCOUNTER — TELEPHONE (OUTPATIENT)
Dept: GASTROENTEROLOGY | Facility: CLINIC | Age: 49
End: 2024-08-20
Payer: COMMERCIAL

## 2024-08-20 DIAGNOSIS — K64.9 HEMORRHOIDS, UNSPECIFIED HEMORRHOID TYPE: Primary | ICD-10-CM

## 2024-08-20 RX ORDER — HYDROCORTISONE ACETATE 25 MG/1
25 SUPPOSITORY RECTAL 2 TIMES DAILY
Qty: 14 SUPPOSITORY | Refills: 0 | Status: SHIPPED | OUTPATIENT
Start: 2024-08-20 | End: 2024-08-27

## 2024-08-26 ENCOUNTER — PRE-ADMISSION TESTING (OUTPATIENT)
Dept: PREADMISSION TESTING | Facility: HOSPITAL | Age: 49
End: 2024-08-26
Payer: COMMERCIAL

## 2024-08-26 ENCOUNTER — ANESTHESIA EVENT (OUTPATIENT)
Dept: GASTROENTEROLOGY | Facility: HOSPITAL | Age: 49
End: 2024-08-26
Payer: COMMERCIAL

## 2024-08-26 VITALS — BODY MASS INDEX: 26.82 KG/M2 | WEIGHT: 198 LBS | HEIGHT: 72 IN

## 2024-08-26 SDOH — HEALTH STABILITY: MENTAL HEALTH: CURRENT SMOKER: 1

## 2024-08-26 ASSESSMENT — DUKE ACTIVITY SCORE INDEX (DASI)
CAN YOU DO LIGHT WORK AROUND THE HOUSE LIKE DUSTING OR WASHING DISHES: YES
CAN YOU PARTICIPATE IN STRENOUS SPORTS LIKE SWIMMING, SINGLES TENNIS, FOOTBALL, BASKETBALL, OR SKIING: NO
CAN YOU PARTICIPATE IN MODERATE RECREATIONAL ACTIVITIES LIKE GOLF, BOWLING, DANCING, DOUBLES TENNIS OR THROWING A BASEBALL OR FOOTBALL: NO
CAN YOU DO MODERATE WORK AROUND THE HOUSE LIKE VACUUMING, SWEEPING FLOORS OR CARRYING GROCERIES: YES
CAN YOU CLIMB A FLIGHT OF STAIRS OR WALK UP A HILL: YES
CAN YOU TAKE CARE OF YOURSELF (EAT, DRESS, BATHE, OR USE TOILET): YES
CAN YOU WALK INDOORS, SUCH AS AROUND YOUR HOUSE: YES
CAN YOU RUN A SHORT DISTANCE: NO
CAN YOU DO HEAVY WORK AROUND THE HOUSE LIKE SCRUBBING FLOORS OR LIFTING AND MOVING HEAVY FURNITURE: YES
CAN YOU WALK A BLOCK OR TWO ON LEVEL GROUND: YES
CAN YOU DO YARD WORK LIKE RAKING LEAVES, WEEDING OR PUSHING A MOWER: YES

## 2024-08-26 NOTE — ANESTHESIA PREPROCEDURE EVALUATION
Patient: Sanford Jones    Procedure Information       Date/Time: 09/16/24 0800    Scheduled providers: Julian Mccann MD    Procedures:       EGD      BRAVO      COLONOSCOPY    Location: [facility]        There were no vitals filed for this visit.    No past surgical history on file.  No past medical history on file.    Current Outpatient Medications:   •  amLODIPine (Norvasc) 5 mg tablet, Take 1 tablet (5 mg) by mouth once daily., Disp: 90 tablet, Rfl: 1  •  aspirin 81 mg EC tablet, Take 1 tablet (81 mg) by mouth once daily., Disp: , Rfl:   •  hydrocortisone (Anusol-HC) 25 mg suppository, Insert 1 suppository (25 mg) into the rectum 2 times a day for 7 days., Disp: 14 suppository, Rfl: 0  •  levothyroxine (Synthroid, Levoxyl) 125 mcg tablet, Take 1 tablet (125 mcg) by mouth once daily., Disp: 90 tablet, Rfl: 3  •  multivit-min-FA-lycopen-lutein (Essential Man 50 Plus) 0.4-2-250 mg-mg-mcg tablet, Take 1 tablet by mouth once daily., Disp: , Rfl:   •  nicotine (Nicoderm CQ) 21 mg/24 hr patch, Place 1 patch over 24 hours on the skin once every 24 hours., Disp: 30 patch, Rfl: 0  •  pantoprazole (ProtoNix) 40 mg EC tablet, Take 1 tablet (40 mg) by mouth 2 times a day before meals., Disp: 180 tablet, Rfl: 3  •  sodium,potassium,mag sulfates (Suprep) 17.5-3.13-1.6 gram recon soln solution, Take one bottle twice as directed by the prep instructions, Disp: 354 mL, Rfl: 0  Prior to Admission medications    Medication Sig Start Date End Date Taking? Authorizing Provider   amLODIPine (Norvasc) 5 mg tablet Take 1 tablet (5 mg) by mouth once daily. 3/26/24   Shantel Andre, APRN-CNP   aspirin 81 mg EC tablet Take 1 tablet (81 mg) by mouth once daily.    Historical Provider, MD   hydrocortisone (Anusol-HC) 25 mg suppository Insert 1 suppository (25 mg) into the rectum 2 times a day for 7 days. 8/20/24 8/27/24  Adry Myers, ABILIO-CNP   levothyroxine (Synthroid, Levoxyl) 125 mcg tablet Take 1 tablet  (125 mcg) by mouth once daily. 6/24/24 6/24/25  ИВАН Aquino   multivit-min-FA-lycopen-lutein (Essential Man 50 Plus) 0.4-2-250 mg-mg-mcg tablet Take 1 tablet by mouth once daily. 7/12/23   Historical Provider, MD   nicotine (Nicoderm CQ) 21 mg/24 hr patch Place 1 patch over 24 hours on the skin once every 24 hours. 8/8/23 9/7/23  ИВАН Aquino   pantoprazole (ProtoNix) 40 mg EC tablet Take 1 tablet (40 mg) by mouth 2 times a day before meals. 7/16/24 7/16/25  ИВАН Portillo   sodium,potassium,mag sulfates (Suprep) 17.5-3.13-1.6 gram recon soln solution Take one bottle twice as directed by the prep instructions 8/5/24   ИВАН Portillo     Allergies   Allergen Reactions   • House Dust Mite Cough, Runny nose, Shortness of breath and Wheezing     Social History     Tobacco Use   • Smoking status: Former     Types: Cigarettes     Passive exposure: Current   • Smokeless tobacco: Former   Substance Use Topics   • Alcohol use: Yes     Comment: Social         Chemistry    Lab Results   Component Value Date/Time     02/04/2023 1045    K 4.2 02/04/2023 1045     02/04/2023 1045    CO2 28 02/04/2023 1045    BUN 16 02/04/2023 1045    CREATININE 0.95 02/04/2023 1045    Lab Results   Component Value Date/Time    CALCIUM 9.1 02/04/2023 1045    ALKPHOS 74 02/04/2023 1045    AST 34 02/04/2023 1045    ALT 58 (H) 02/04/2023 1045    BILITOT 0.4 02/04/2023 1045          Lab Results   Component Value Date/Time    WBC 9.2 07/12/2023 0954    HGB 14.1 07/12/2023 0954    HCT 41.6 07/12/2023 0954     07/12/2023 0954     Lab Results   Component Value Date/Time    PROTIME 11.6 02/04/2023 1045    INR 1.0 02/04/2023 1045     No results found for this or any previous visit (from the past 4464 hour(s)).  No results found for this or any previous visit from the past 1095 days.     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      Pulmonary (within normal limits)      Neuro   (+) Sciatica      GI   (+) GERD (gastroesophageal reflux disease)      /Renal (within normal limits)      Liver (within normal limits)      Endocrine   (+) Hypothyroidism   (+) Obesity      Hematology (within normal limits)      Musculoskeletal (within normal limits)      HEENT (within normal limits)      ID   (+) Influenza      Skin (within normal limits)      GYN (within normal limits)       Clinical information reviewed:                 3/8/23 ECG-  Normal sinus rhythm  Normal ECG  No previous ECGs available  Confirmed by Cruz Aguiar ([id number]) on 3/8/2023 10:35:17 AM      Avoid medications that would potentially contribute to long QT syndrome. Like Zofran>     CT SCAN 03/21    IMPRESSION:     Diffuse colonic diverticulosis and suspect mild diverticulitis distal   descending colon close to its junction with sigmoid colon.   No free intraperitoneal air or abscess formation.   Diffuse hepatic steatosis.     Requesting ECG on morning of procedure. ordered    NPO Detail:  No data recorded     Physical Exam    Airway  Mallampati: II  TM distance: >3 FB  Neck ROM: full     Cardiovascular - normal exam     Dental - normal exam     Pulmonary - normal exam     Abdominal - normal exam         Anesthesia Plan    History of general anesthesia?: no  History of complications of general anesthesia?: no    ASA 3     MAC     The patient is a current smoker.  Patient was not previously instructed to abstain from smoking on day of procedure.  Patient did not smoke on day of procedure.    intravenous induction   Anesthetic plan and risks discussed with patient.  Use of blood products discussed with patient who consented to blood products.    Plan discussed with CRNA.

## 2024-08-26 NOTE — PREPROCEDURE INSTRUCTIONS
Medication List            Accurate as of August 26, 2024  1:22 PM. Always use your most recent med list.                amLODIPine 5 mg tablet  Commonly known as: Norvasc  Take 1 tablet (5 mg) by mouth once daily.  Medication Adjustments for Surgery: Take morning of surgery with sip of water, no other fluids     aspirin 81 mg EC tablet  Medication Adjustments for Surgery: Stop 7 days before surgery     Essential Man 50 Plus 0.4-2-250 mg-mg-mcg tablet  Generic drug: multivitamin-minerals-folic acid-lycopen-lutein  Medication Adjustments for Surgery: Stop 1 day before surgery     hydrocortisone 25 mg suppository  Commonly known as: Anusol-HC  Insert 1 suppository (25 mg) into the rectum 2 times a day for 7 days.  Medication Adjustments for Surgery: Other (Comment)     levothyroxine 125 mcg tablet  Commonly known as: Synthroid, Levoxyl  Take 1 tablet (125 mcg) by mouth once daily.  Medication Adjustments for Surgery: Take morning of surgery with sip of water, no other fluids     nicotine 21 mg/24 hr patch  Commonly known as: Nicoderm CQ  Place 1 patch over 24 hours on the skin once every 24 hours.  Medication Adjustments for Surgery: Other (Comment)     pantoprazole 40 mg EC tablet  Commonly known as: ProtoNix  Take 1 tablet (40 mg) by mouth 2 times a day before meals.  Medication Adjustments for Surgery: Stop 7 days before surgery     sodium,potassium,mag sulfates 17.5-3.13-1.6 gram recon soln solution  Commonly known as: Suprep  Take one bottle twice as directed by the prep instructions  Medication Adjustments for Surgery: Take morning of surgery with sip of water, no other fluids                              NPO Instructions:    Follow instructions. Day before procedure- CLEAR LIQUIDS ONLY-No dairy products, nothing red/purple.  Take first part of prep- Evening Before Procedure (6pm) Drink lots of liquids.  Day of Procedure- Take Second Part of Prep take between 3-4am.   STOP DRINKING 3 HOURS PRIOR TO  PROCEDURE.  Take medications as instructed.       Additional Instructions:     Review your medication instructions, take indicated medications  Wear  comfortable loose fitting clothing  All jewelry and valuables should be left at home    Park in back of hospital by ER. Come up to Second floor-Outpt dept to check in.  Bring Photo ID and Insurance card,   You MUST have a  with you.  No more than 2 visitors with you please.      If you get ill at all before your procedure- CALL YOUR DOCTOR/SURGEON.  We want you in the best shape that is possible. Any sickness might lead to your procedure being delayed.      Call Outpatient dept at [phone] the night before your procedure (Friday for Monday procedure), between 1-3 pm.        Do not use Marijuana products x 24 hours prior to your procedure. You may be cancelled.  Do Not Vape x 24 hours before your procedure or more.     Information will be emailed to you about an educational Bravo video.    Stop Aspirin and pantoprazole (any stomach heartburn meds) x 7 days at least.  The Unit for monitoring must be kept dry and use the buttons to indicate when you eat, sleep- [facility]   MRIs can not be done for 30 min after the procedure.

## 2024-09-02 DIAGNOSIS — R06.02 SOB (SHORTNESS OF BREATH): Primary | ICD-10-CM

## 2024-09-02 DIAGNOSIS — I73.00 RAYNAUD'S DISEASE WITHOUT GANGRENE: ICD-10-CM

## 2024-09-09 RX ORDER — ALBUTEROL SULFATE 0.83 MG/ML
2.5 SOLUTION RESPIRATORY (INHALATION) EVERY 4 HOURS PRN
Qty: 75 ML | Refills: 1 | Status: SHIPPED | OUTPATIENT
Start: 2024-09-09

## 2024-09-09 RX ORDER — AMLODIPINE BESYLATE 5 MG/1
5 TABLET ORAL DAILY
Qty: 90 TABLET | Refills: 3 | Status: SHIPPED | OUTPATIENT
Start: 2024-09-09

## 2024-09-09 RX ORDER — ALBUTEROL SULFATE 0.83 MG/ML
2.5 SOLUTION RESPIRATORY (INHALATION) EVERY 4 HOURS PRN
COMMUNITY
End: 2024-09-09 | Stop reason: SDUPTHER

## 2024-09-16 ENCOUNTER — HOSPITAL ENCOUNTER (OUTPATIENT)
Dept: CARDIOLOGY | Facility: HOSPITAL | Age: 49
Discharge: HOME | End: 2024-09-16
Payer: COMMERCIAL

## 2024-09-16 ENCOUNTER — ANESTHESIA (OUTPATIENT)
Dept: GASTROENTEROLOGY | Facility: HOSPITAL | Age: 49
End: 2024-09-16
Payer: COMMERCIAL

## 2024-09-16 ENCOUNTER — HOSPITAL ENCOUNTER (OUTPATIENT)
Dept: GASTROENTEROLOGY | Facility: HOSPITAL | Age: 49
Setting detail: OUTPATIENT SURGERY
Discharge: HOME | End: 2024-09-16
Payer: COMMERCIAL

## 2024-09-16 VITALS
RESPIRATION RATE: 16 BRPM | HEIGHT: 72 IN | BODY MASS INDEX: 26.82 KG/M2 | WEIGHT: 198 LBS | TEMPERATURE: 96.8 F | SYSTOLIC BLOOD PRESSURE: 117 MMHG | DIASTOLIC BLOOD PRESSURE: 85 MMHG | OXYGEN SATURATION: 99 % | HEART RATE: 69 BPM

## 2024-09-16 DIAGNOSIS — K57.32 DIVERTICULITIS OF COLON: ICD-10-CM

## 2024-09-16 DIAGNOSIS — K21.9 GASTROESOPHAGEAL REFLUX DISEASE WITHOUT ESOPHAGITIS: ICD-10-CM

## 2024-09-16 DIAGNOSIS — K57.90 DIVERTICULOSIS: ICD-10-CM

## 2024-09-16 LAB
ATRIAL RATE: 79 BPM
P AXIS: 82 DEGREES
P OFFSET: 194 MS
P ONSET: 143 MS
PR INTERVAL: 168 MS
Q ONSET: 227 MS
QRS COUNT: 13 BEATS
QRS DURATION: 78 MS
QT INTERVAL: 404 MS
QTC CALCULATION(BAZETT): 463 MS
QTC FREDERICIA: 443 MS
R AXIS: 58 DEGREES
T AXIS: 53 DEGREES
T OFFSET: 429 MS
VENTRICULAR RATE: 79 BPM

## 2024-09-16 PROCEDURE — 2720000007 HC OR 272 NO HCPCS

## 2024-09-16 PROCEDURE — 7100000010 HC PHASE TWO TIME - EACH INCREMENTAL 1 MINUTE

## 2024-09-16 PROCEDURE — 7100000009 HC PHASE TWO TIME - INITIAL BASE CHARGE

## 2024-09-16 PROCEDURE — 93005 ELECTROCARDIOGRAM TRACING: CPT

## 2024-09-16 PROCEDURE — 45378 DIAGNOSTIC COLONOSCOPY: CPT | Performed by: SURGERY

## 2024-09-16 PROCEDURE — 43239 EGD BIOPSY SINGLE/MULTIPLE: CPT | Performed by: SURGERY

## 2024-09-16 PROCEDURE — 3700000002 HC GENERAL ANESTHESIA TIME - EACH INCREMENTAL 1 MINUTE

## 2024-09-16 PROCEDURE — 3700000001 HC GENERAL ANESTHESIA TIME - INITIAL BASE CHARGE

## 2024-09-16 PROCEDURE — 2500000004 HC RX 250 GENERAL PHARMACY W/ HCPCS (ALT 636 FOR OP/ED)

## 2024-09-16 PROCEDURE — 2500000005 HC RX 250 GENERAL PHARMACY W/O HCPCS: Performed by: NURSE ANESTHETIST, CERTIFIED REGISTERED

## 2024-09-16 PROCEDURE — 2500000004 HC RX 250 GENERAL PHARMACY W/ HCPCS (ALT 636 FOR OP/ED): Performed by: NURSE ANESTHETIST, CERTIFIED REGISTERED

## 2024-09-16 RX ORDER — LIDOCAINE HYDROCHLORIDE 20 MG/ML
INJECTION, SOLUTION EPIDURAL; INFILTRATION; INTRACAUDAL; PERINEURAL AS NEEDED
Status: DISCONTINUED | OUTPATIENT
Start: 2024-09-16 | End: 2024-09-16

## 2024-09-16 RX ORDER — SODIUM CHLORIDE, SODIUM LACTATE, POTASSIUM CHLORIDE, CALCIUM CHLORIDE 600; 310; 30; 20 MG/100ML; MG/100ML; MG/100ML; MG/100ML
20 INJECTION, SOLUTION INTRAVENOUS CONTINUOUS
Status: DISCONTINUED | OUTPATIENT
Start: 2024-09-16 | End: 2024-09-17 | Stop reason: HOSPADM

## 2024-09-16 RX ORDER — PROPOFOL 10 MG/ML
INJECTION, EMULSION INTRAVENOUS AS NEEDED
Status: DISCONTINUED | OUTPATIENT
Start: 2024-09-16 | End: 2024-09-16

## 2024-09-16 ASSESSMENT — ENCOUNTER SYMPTOMS
RECTAL PAIN: 0
WOUND: 0
SHORTNESS OF BREATH: 0
FEVER: 0
FATIGUE: 0
ABDOMINAL DISTENTION: 1
ABDOMINAL PAIN: 1
NEUROLOGICAL NEGATIVE: 1
BLOOD IN STOOL: 0
DIARRHEA: 1
DIAPHORESIS: 0
CHILLS: 0
CONSTIPATION: 0
ANAL BLEEDING: 0
MUSCULOSKELETAL NEGATIVE: 1
VOMITING: 0
NAUSEA: 0

## 2024-09-16 ASSESSMENT — PAIN SCALES - GENERAL
PAINLEVEL_OUTOF10: 0 - NO PAIN

## 2024-09-16 ASSESSMENT — PAIN - FUNCTIONAL ASSESSMENT
PAIN_FUNCTIONAL_ASSESSMENT: 0-10

## 2024-09-16 ASSESSMENT — COLUMBIA-SUICIDE SEVERITY RATING SCALE - C-SSRS
1. IN THE PAST MONTH, HAVE YOU WISHED YOU WERE DEAD OR WISHED YOU COULD GO TO SLEEP AND NOT WAKE UP?: NO
6. HAVE YOU EVER DONE ANYTHING, STARTED TO DO ANYTHING, OR PREPARED TO DO ANYTHING TO END YOUR LIFE?: NO
2. HAVE YOU ACTUALLY HAD ANY THOUGHTS OF KILLING YOURSELF?: NO

## 2024-09-16 NOTE — POST-PROCEDURE NOTE
0941 Dr Mccann at bedside, discusses procedure findings with pt and wife  0943 DC instructions reviewed with pt and wife, Bravo educational video viewed by pt and wife

## 2024-09-16 NOTE — H&P
History Of Present Illness  Sanford Jones is a 49 y.o. male presenting with GERD and diverticulitis. He is here for a EGD and colonoscopy. Was seen in the GI clinic on 7/16 with Adry Myers NP. History of gastric ulcer. Is taking pantoprazole twice daily but reports LUQ pain, bloating and reflux symptoms. History of diverticulitis in May. More frequent bowel movements with mixed diarrhea. No family history of CRC or IBD. Tolerated prep well but did have some vomiting last night. Smoked a cigarette this morning. Denies fever, chills, SOB, CP, n/v/d.     EGD/COLONOSCOPY  EGD 4/9/2021 with Dr. Arreola-mild antral gastritis, no H. pylori noted on biopsies, small sliding hiatal hernia.  Normal esophagus, normal duodenum to D3     Colonoscopy 4/9/2021 with Dr. Arreola- normal terminal ileum, diminutive cecal tubular adenoma, scattered diverticulosis, more extensive diverticulosis throughout sigmoid colon.  Prolapsed mucosa noted in several segments of sigmoid colon along with punctate erythema in the folds within sigmoid colon around diverticulum, no signs of acute diverticulitis.     EGD 12/27/2018- Impression:            - LA Grade B reflux esophagitis. Biopsied.   - 4 cm hiatal hernia.                        - Normal examined duodenum.                        - Gastritis. Biopsied.      Colonoscopy 12/27/2018 with Dr. Pacheco-Impression:            - One 4 mm polyp at the recto-sigmoid colon, removed with a cold snare. Resected and retrieved.   - Moderate diverticulosis in the sigmoid colon, in the descending colon and in the cecum. There was narrowing of the colon in association with the diverticular opening. There was evidence of an impacted diverticulum. Petechia were visualized in association with the diverticular opening. There was no evidence of diverticular bleeding.   - The examined portion of the ileum was normal.      1. Gastric antrum/body, biopsy (A) - Oxyntic and antral   mucosa with no significant  diagnostic alteration. - No morphologic evidence   of Helicobacter pylori organisms.     2. Distal esophagus, biopsy (B) -   Esophagitis with fungal elements morphologically consistent with Candida   species and admixed bacterial colonies. - Scant glandular epithelium,   negative for intestinal metaplasia.    3. Rectal, sigmoid polyp, biopsy (C) -   Sessile serrated polyp.         Past Medical History  Past Medical History:   Diagnosis Date    Awareness under anesthesia     GERD (gastroesophageal reflux disease)     Hyperlipidemia     Hypertension        Surgical History  Past Surgical History:   Procedure Laterality Date    COLONOSCOPY      ESOPHAGOGASTRODUODENOSCOPY          Social History  He reports that he has quit smoking. His smoking use included cigarettes. He started smoking about 36 years ago. He has a 36.7 pack-year smoking history. He has been exposed to tobacco smoke. He has quit using smokeless tobacco. He reports current alcohol use. He reports that he does not use drugs.    Family History  Family History   Problem Relation Name Age of Onset    Diabetes type II Father      Long QT syndrome Son      Other (monoallelic deletion in KCNQ1 gene) Son      Other (monoallelic mutation of NIT88U1) Son      Other (nonalcoholic steatohepatitis) Son      Diabetes Son      Diabetes type II Paternal Grandfather          Allergies  House dust mite    Current Outpatient Medications on File Prior to Encounter   Medication Sig Dispense Refill    amLODIPine (Norvasc) 5 mg tablet TAKE 1 TABLET DAILY 90 tablet 3    aspirin 81 mg EC tablet Take 1 tablet (81 mg) by mouth once daily.      levothyroxine (Synthroid, Levoxyl) 125 mcg tablet Take 1 tablet (125 mcg) by mouth once daily. 90 tablet 3    multivit-min-FA-lycopen-lutein (Essential Man 50 Plus) 0.4-2-250 mg-mg-mcg tablet Take 1 tablet by mouth once daily.      pantoprazole (ProtoNix) 40 mg EC tablet Take 1 tablet (40 mg) by mouth 2 times a day before meals. 180 tablet  3    sodium,potassium,mag sulfates (Suprep) 17.5-3.13-1.6 gram recon soln solution Take one bottle twice as directed by the prep instructions 354 mL 0    albuterol 2.5 mg /3 mL (0.083 %) nebulizer solution Take 3 mL (2.5 mg) by nebulization every 4 hours if needed for wheezing. (Patient not taking: Reported on 9/16/2024) 75 mL 1    nicotine (Nicoderm CQ) 21 mg/24 hr patch Place 1 patch over 24 hours on the skin once every 24 hours. 30 patch 0     No current facility-administered medications on file prior to encounter.       Review of Systems   Constitutional:  Negative for chills, diaphoresis, fatigue and fever.   HENT: Negative.     Respiratory:  Negative for shortness of breath.    Cardiovascular:  Negative for chest pain.   Gastrointestinal:  Positive for abdominal distention, abdominal pain and diarrhea. Negative for anal bleeding, blood in stool, constipation, nausea, rectal pain and vomiting.   Genitourinary: Negative.    Musculoskeletal: Negative.    Skin:  Negative for pallor, rash and wound.   Neurological: Negative.         Physical Exam  Vitals reviewed.   Constitutional:       General: He is not in acute distress.     Appearance: Normal appearance.   HENT:      Head: Normocephalic.      Mouth/Throat:      Mouth: Mucous membranes are moist.   Eyes:      General: No scleral icterus.     Conjunctiva/sclera: Conjunctivae normal.      Pupils: Pupils are equal, round, and reactive to light.   Cardiovascular:      Rate and Rhythm: Normal rate and regular rhythm.      Pulses: Normal pulses.      Heart sounds: Normal heart sounds.   Pulmonary:      Effort: Pulmonary effort is normal. No respiratory distress.      Breath sounds: Normal breath sounds.   Abdominal:      General: Abdomen is flat. Bowel sounds are normal. There is no distension.      Palpations: Abdomen is soft. There is no mass.      Tenderness: There is no abdominal tenderness. There is no guarding or rebound.   Musculoskeletal:         General:  Normal range of motion.   Skin:     General: Skin is warm and dry.   Neurological:      General: No focal deficit present.      Mental Status: He is alert and oriented to person, place, and time. Mental status is at baseline.   Psychiatric:         Mood and Affect: Mood normal.        Last Recorded Vitals  Blood pressure 123/65, pulse 81, temperature 36 °C (96.8 °F), temperature source Temporal, resp. rate 17, height 1.829 m (6'), weight 89.8 kg (198 lb), SpO2 98%.       Assessment/Plan   Assessment & Plan  Gastroesophageal reflux disease without esophagitis  COLÓN, EGD  Diverticulosis  Colonoscopy  Diverticulitis of colon  Colonoscopy         I spent 30 minutes in the professional and overall care of this patient.      Keisha Villegas PA-C

## 2024-09-16 NOTE — ANESTHESIA POSTPROCEDURE EVALUATION
Patient: Sanford Youngogast    Procedure Summary       Date: 09/16/24 Room / Location: [facility]    Anesthesia Start: 0817 Anesthesia Stop: 0856    Procedures:       EGD      BRAVO      COLONOSCOPY Diagnosis:       Gastroesophageal reflux disease without esophagitis      Diverticulosis      Diverticulitis of colon    Scheduled Providers: Julian Mccann MD Responsible Provider: KIRA Edwards    Anesthesia Type: MAC ASA Status: 3            Anesthesia Type: MAC    Vitals Value Taken Time   /59 09/16/24 0854   Temp 36 °C (96.8 °F) 09/16/24 0854   Pulse 51 09/16/24 0854   Resp 16 09/16/24 0854   SpO2 98 % 09/16/24 0854       Anesthesia Post Evaluation    Patient location during evaluation: PACU  Patient participation: complete - patient participated  Level of consciousness: awake and alert  Pain management: satisfactory to patient  Airway patency: patent  Cardiovascular status: acceptable  Respiratory status: acceptable  Hydration status: acceptable  Postoperative Nausea and Vomiting: none        There were no known notable events for this encounter.

## 2024-09-24 LAB
LABORATORY COMMENT REPORT: NORMAL
PATH REPORT.FINAL DX SPEC: NORMAL
PATH REPORT.GROSS SPEC: NORMAL
PATH REPORT.RELEVANT HX SPEC: NORMAL
PATH REPORT.TOTAL CANCER: NORMAL

## 2024-09-25 DIAGNOSIS — K44.9 HIATAL HERNIA WITH GERD: Primary | ICD-10-CM

## 2024-09-25 DIAGNOSIS — K21.9 HIATAL HERNIA WITH GERD: Primary | ICD-10-CM

## 2024-11-11 ENCOUNTER — OFFICE VISIT (OUTPATIENT)
Dept: URGENT CARE | Facility: URGENT CARE | Age: 49
End: 2024-11-11
Payer: COMMERCIAL

## 2024-11-11 VITALS
TEMPERATURE: 98.5 F | WEIGHT: 194.45 LBS | OXYGEN SATURATION: 97 % | RESPIRATION RATE: 18 BRPM | HEART RATE: 73 BPM | BODY MASS INDEX: 26.37 KG/M2 | SYSTOLIC BLOOD PRESSURE: 108 MMHG | DIASTOLIC BLOOD PRESSURE: 71 MMHG

## 2024-11-11 DIAGNOSIS — J20.0 ACUTE BRONCHITIS DUE TO MYCOPLASMA PNEUMONIAE: ICD-10-CM

## 2024-11-11 DIAGNOSIS — R11.2 NAUSEA AND VOMITING, UNSPECIFIED VOMITING TYPE: ICD-10-CM

## 2024-11-11 DIAGNOSIS — J98.01 ACUTE BRONCHOSPASM: ICD-10-CM

## 2024-11-11 DIAGNOSIS — R05.9 COUGH, UNSPECIFIED TYPE: Primary | ICD-10-CM

## 2024-11-11 LAB
POC RAPID STREP: NEGATIVE
POC SARS-COV-2 AG BINAX: NORMAL

## 2024-11-11 PROCEDURE — 99203 OFFICE O/P NEW LOW 30 MIN: CPT | Performed by: FAMILY MEDICINE

## 2024-11-11 PROCEDURE — 87880 STREP A ASSAY W/OPTIC: CPT | Performed by: FAMILY MEDICINE

## 2024-11-11 PROCEDURE — 87811 SARS-COV-2 COVID19 W/OPTIC: CPT | Performed by: FAMILY MEDICINE

## 2024-11-11 RX ORDER — INHALER, ASSIST DEVICES
SPACER (EA) MISCELLANEOUS
Qty: 1 EACH | Refills: 0 | Status: SHIPPED | OUTPATIENT
Start: 2024-11-11

## 2024-11-11 RX ORDER — ALBUTEROL SULFATE 90 UG/1
INHALANT RESPIRATORY (INHALATION)
Qty: 6.7 G | Refills: 0 | Status: SHIPPED | OUTPATIENT
Start: 2024-11-11

## 2024-11-11 RX ORDER — DOXYCYCLINE 100 MG/1
100 CAPSULE ORAL 2 TIMES DAILY
Qty: 14 CAPSULE | Refills: 0 | Status: SHIPPED | OUTPATIENT
Start: 2024-11-11 | End: 2024-11-18

## 2024-11-11 RX ORDER — PREDNISONE 20 MG/1
40 TABLET ORAL DAILY
Qty: 10 TABLET | Refills: 0 | Status: SHIPPED | OUTPATIENT
Start: 2024-11-11 | End: 2024-11-16

## 2024-11-11 NOTE — PATIENT INSTRUCTIONS
You have a lower respiratory infection with cough and acute bronchitis with bronchospasm  Please take prednisone as a single dose early in the day with food as prescribed  Use inhaler with spacer device 2 puffs every 4-6 hours for the next 7-10 days, decrease frequency of use as symptoms improve. Please take 5-6 deep breaths after activating inhaler from spacer chamber. Pause for approximately 1-2 minutes.  Repeat activation of inhaler and 5-6 deep breaths from spacer.  Increase oral fluids for the next 7-10 days  Please take doxycycline as prescribed. Do not take this medication at the same meal that you are consuming dairy products or nutritional supplements as it will reduce absorption of the antibiotic. However try to take this medicine with at least an 8 ounce glass of water and some food to reduce the chances of stomach upset. I recommend using probiotics while taking the antibiotic and for a week to 10 days after you have completed this medication. Please ask the pharmacist for advice on appropriate product for this purpose.  After you have completed the prednisone, you may use ibuprofen 200mg, 2 tabs by mouth every 8 hours with food as needed for discomfort or fever.  May take Tylenol 325 mg 2 tablets by mouth every 4-6 hours as needed for discomfort or fever  If no improvement in 5-7 days follow-up with primary care provider  If fever greater than 102 degrees Fahrenheit, chills, nausea, vomiting,  increased difficulty breathing, difficulty swallowing,  increased shortness of breath, worsening wheezing go to emergency department for further evaluation  This note was generated by voice recognition software. Minor transcription/grammatical errors may be present. Please call for clarification.

## 2024-11-13 DIAGNOSIS — R06.02 SOB (SHORTNESS OF BREATH): ICD-10-CM

## 2024-11-13 RX ORDER — ALBUTEROL SULFATE 0.83 MG/ML
2.5 SOLUTION RESPIRATORY (INHALATION) EVERY 4 HOURS PRN
Qty: 75 ML | Refills: 1 | Status: SHIPPED | OUTPATIENT
Start: 2024-11-13

## 2024-11-14 ASSESSMENT — ENCOUNTER SYMPTOMS
TROUBLE SWALLOWING: 1
WHEEZING: 1
VOMITING: 1
CHEST TIGHTNESS: 0
PALPITATIONS: 0
RHINORRHEA: 1
CONSTIPATION: 0
DIARRHEA: 1
DYSURIA: 0
COUGH: 1
HEADACHES: 0
NAUSEA: 1
SORE THROAT: 1
SINUS PRESSURE: 1
ABDOMINAL PAIN: 0
SHORTNESS OF BREATH: 1
CHILLS: 0
FREQUENCY: 0
FEVER: 0

## 2024-11-20 DIAGNOSIS — J18.9 PNEUMONIA DUE TO INFECTIOUS ORGANISM, UNSPECIFIED LATERALITY, UNSPECIFIED PART OF LUNG: ICD-10-CM

## 2024-11-20 RX ORDER — AZITHROMYCIN 250 MG/1
TABLET, FILM COATED ORAL
Qty: 6 TABLET | Refills: 0 | Status: SHIPPED | OUTPATIENT
Start: 2024-11-20 | End: 2024-11-25

## 2024-12-06 DIAGNOSIS — I73.00 RAYNAUD'S DISEASE WITHOUT GANGRENE: ICD-10-CM

## 2024-12-09 RX ORDER — AMLODIPINE BESYLATE 5 MG/1
5 TABLET ORAL DAILY
Qty: 90 TABLET | Refills: 1 | Status: SHIPPED | OUTPATIENT
Start: 2024-12-09

## 2025-01-14 ENCOUNTER — OFFICE VISIT (OUTPATIENT)
Dept: URGENT CARE | Facility: URGENT CARE | Age: 50
End: 2025-01-14
Payer: COMMERCIAL

## 2025-01-14 ENCOUNTER — ANCILLARY PROCEDURE (OUTPATIENT)
Dept: URGENT CARE | Facility: URGENT CARE | Age: 50
End: 2025-01-14
Payer: COMMERCIAL

## 2025-01-14 VITALS
TEMPERATURE: 98 F | DIASTOLIC BLOOD PRESSURE: 83 MMHG | SYSTOLIC BLOOD PRESSURE: 127 MMHG | WEIGHT: 187.39 LBS | RESPIRATION RATE: 20 BRPM | HEIGHT: 72 IN | BODY MASS INDEX: 25.38 KG/M2 | HEART RATE: 95 BPM | OXYGEN SATURATION: 91 %

## 2025-01-14 DIAGNOSIS — J02.9 SORE THROAT: ICD-10-CM

## 2025-01-14 DIAGNOSIS — R50.9 FEVER, UNSPECIFIED FEVER CAUSE: ICD-10-CM

## 2025-01-14 DIAGNOSIS — R06.02 SOB (SHORTNESS OF BREATH): ICD-10-CM

## 2025-01-14 DIAGNOSIS — R06.2 WHEEZING: Primary | ICD-10-CM

## 2025-01-14 DIAGNOSIS — R09.81 CONGESTION OF NASAL SINUS: ICD-10-CM

## 2025-01-14 DIAGNOSIS — J45.41 MODERATE PERSISTENT ASTHMATIC BRONCHITIS WITH ACUTE EXACERBATION (HHS-HCC): ICD-10-CM

## 2025-01-14 LAB
POC RAPID INFLUENZA A: NEGATIVE
POC RAPID INFLUENZA B: NEGATIVE
POC RAPID STREP: NEGATIVE
POC SARS-COV-2 AG BINAX: NORMAL

## 2025-01-14 PROCEDURE — 71046 X-RAY EXAM CHEST 2 VIEWS: CPT | Performed by: PHYSICIAN ASSISTANT

## 2025-01-14 RX ORDER — BENZONATATE 200 MG/1
200 CAPSULE ORAL 3 TIMES DAILY PRN
Qty: 30 CAPSULE | Refills: 0 | Status: SHIPPED | OUTPATIENT
Start: 2025-01-14 | End: 2025-01-24

## 2025-01-14 RX ORDER — IPRATROPIUM BROMIDE AND ALBUTEROL SULFATE 2.5; .5 MG/3ML; MG/3ML
3 SOLUTION RESPIRATORY (INHALATION) ONCE
Status: COMPLETED | OUTPATIENT
Start: 2025-01-14 | End: 2025-01-14

## 2025-01-14 RX ORDER — ALBUTEROL SULFATE 90 UG/1
2 INHALANT RESPIRATORY (INHALATION) EVERY 6 HOURS PRN
Qty: 18 G | Refills: 0 | Status: SHIPPED | OUTPATIENT
Start: 2025-01-14 | End: 2026-01-14

## 2025-01-14 RX ORDER — PREDNISONE 20 MG/1
TABLET ORAL
Qty: 16 TABLET | Refills: 0 | Status: SHIPPED | OUTPATIENT
Start: 2025-01-14

## 2025-01-14 RX ORDER — AZITHROMYCIN 250 MG/1
250 TABLET, FILM COATED ORAL DAILY
Qty: 6 TABLET | Refills: 0 | Status: SHIPPED | OUTPATIENT
Start: 2025-01-14 | End: 2025-01-19

## 2025-01-14 RX ORDER — AMOXICILLIN AND CLAVULANATE POTASSIUM 875; 125 MG/1; MG/1
875 TABLET, FILM COATED ORAL 2 TIMES DAILY
Qty: 20 TABLET | Refills: 0 | Status: SHIPPED | OUTPATIENT
Start: 2025-01-14

## 2025-01-14 RX ORDER — METHYLPREDNISOLONE SODIUM SUCCINATE 125 MG/2ML
125 INJECTION INTRAMUSCULAR; INTRAVENOUS ONCE
Status: COMPLETED | OUTPATIENT
Start: 2025-01-14 | End: 2025-01-14

## 2025-01-14 RX ADMIN — IPRATROPIUM BROMIDE AND ALBUTEROL SULFATE 3 ML: 2.5; .5 SOLUTION RESPIRATORY (INHALATION) at 10:26

## 2025-01-14 RX ADMIN — METHYLPREDNISOLONE SODIUM SUCCINATE 125 MG: 125 INJECTION INTRAMUSCULAR; INTRAVENOUS at 10:26

## 2025-01-14 ASSESSMENT — ENCOUNTER SYMPTOMS
CHILLS: 1
SHORTNESS OF BREATH: 1
RHINORRHEA: 1
COUGH: 1
WHEEZING: 1
MYALGIAS: 1
CARDIOVASCULAR NEGATIVE: 1
FATIGUE: 1

## 2025-01-14 NOTE — LETTER
January 14, 2025     Patient: Sanford Jones   YOB: 1975   Date of Visit: 1/14/2025       To Whom It May Concern:    Sanford Jones was seen in my clinic on 1/14/2025 at 10:15 am. Please excuse Sanford harrell from work for January 14 through January 18, 2025  If you have any questions or concerns, please don't hesitate to call.         Sincerely,         VENKAT PetersenC        CC: No Recipients

## 2025-01-14 NOTE — PATIENT INSTRUCTIONS
Follow-up with pulmonary call for next available    Pooja mejia, [phone]  Or  whoever is on for pulmonary at [facility]    If your symptoms are worse you need to go to the emergency room for further testing and treatment

## 2025-01-14 NOTE — PROGRESS NOTES
"Subjective   Patient ID: Sanford Jones is a 49 y.o. male. They present today with a chief complaint of Cough (Cough sob sore throat x2 days).  With past medical history positive for hypertension hyperlipidemia chronic/intermittent asthmatic bronchitis when he gets upper respiratory symptoms(has nebulizer at home) Raynaud's phenomenon, tobacco use history patient with abrupt onset of cough congestion scratchy throat that started yesterday morning patient's son is sick over the last week  Patient denies any fevers no chest pain does complaint of bodyaches chills cough wheezing  Did use his nebulizer this morning with improvement of symptoms but states the symptoms only last \"a couple hours\" states he is out of his albuterol inhaler,       History of Present Illness    Cough  Associated symptoms include chills, myalgias, rhinorrhea, shortness of breath and wheezing. Pertinent negatives include no chest pain.       Past Medical History  Allergies as of 01/14/2025 - Reviewed 01/14/2025   Allergen Reaction Noted    House dust mite Cough, Runny nose, Shortness of breath, and Wheezing 08/08/2023       (Not in a hospital admission)       Past Medical History:   Diagnosis Date    Awareness under anesthesia     GERD (gastroesophageal reflux disease)     Hyperlipidemia     Hypertension        Past Surgical History:   Procedure Laterality Date    COLONOSCOPY      ESOPHAGOGASTRODUODENOSCOPY          reports that he has quit smoking. His smoking use included cigarettes. He started smoking about 37 years ago. He has a 37 pack-year smoking history. He has been exposed to tobacco smoke. He has quit using smokeless tobacco. He reports current alcohol use. He reports that he does not use drugs.  Did report that he \"quit smoking\" but will intermittently  vapes, and intermittently have 1 cigarette if he is had 1 or 2 beers states this is maybe couple times a month    Family history of COPD      Review of Systems  Review of Systems "   Constitutional:  Positive for chills and fatigue.   HENT:  Positive for congestion and rhinorrhea.    Respiratory:  Positive for cough, shortness of breath and wheezing.    Cardiovascular: Negative.  Negative for chest pain.   Musculoskeletal:  Positive for myalgias.   All other systems reviewed and are negative.                                 Objective    Vitals:    01/14/25 1004   BP: 127/83   Pulse: 95   Resp: 20   Temp: 36.7 °C (98 °F)   TempSrc: Oral   SpO2: 91%   Weight: 85 kg (187 lb 6.3 oz)   Height: 1.829 m (6')     No LMP for male patient.    Physical Exam  Vitals and nursing note reviewed.   Constitutional:       Appearance: Normal appearance.   HENT:      Head: Normocephalic and atraumatic.      Right Ear: Tympanic membrane, ear canal and external ear normal.      Left Ear: Tympanic membrane, ear canal and external ear normal.      Nose: Congestion and rhinorrhea present.   Eyes:      Extraocular Movements: Extraocular movements intact.      Pupils: Pupils are equal, round, and reactive to light.   Cardiovascular:      Rate and Rhythm: Normal rate and regular rhythm.      Pulses: Normal pulses.      Heart sounds: Normal heart sounds.   Pulmonary:      Effort: Pulmonary effort is normal.      Breath sounds: Wheezing present.   Abdominal:      Palpations: Abdomen is soft.   Musculoskeletal:         General: Normal range of motion.      Cervical back: Normal range of motion and neck supple.   Skin:     General: Skin is warm.      Capillary Refill: Capillary refill takes less than 2 seconds.   Neurological:      General: No focal deficit present.      Mental Status: He is alert and oriented to person, place, and time.         Procedures    Point of Care Test & Imaging Results from this visit  No results found for this visit on 01/14/25.   No results found.    Diagnostic study results (if any) were reviewed by Marley Crespo PA-C.    Assessment/Plan   Allergies, medications, history, and pertinent  "labs/EKGs/Imaging reviewed by Marley Crespo PA-C.     Medical Decision Making  Differential:   1) pneumonia   2) asthmatic bronchitis   3) COVID/flu    49-year-old male with past medical history asthmatic bronchitis whenever he gets upper respiratory symptoms usually requiring nebulizers started with an episode yesterday cough and congestion did use his nebulizer this morning but states it only lasted \"a few hours\" on exam he had audible wheezing O2 sats in the low 90s repeat from 91% up to 92% after the tx , xray and steroids   Will refer to pulm   Patient also with history of tobacco use and vaping and a family history of COPD  Will check a chest x-ray given dose of Solu-Medrol here and nebulizer treatment, along with checking COVID and influenza--> both of these test were negative    Xray shows increased vascular congestion and right mid to lower lobe was present with previous x-ray but appears to be worse, as stated above will refer to pulmonary but stressed to the patient that if his symptoms are not improving or getting worse he needs to go to the ER immediately    Will treat as asthmatic bronchitis will prescribe prednisone, new inhaler, medication for his nebulizer at home and Tessalon Perles    After the nebulizer treatment patient's symptoms did not improve slightly 02% 92 ,    Plan: Discussed differential with the patient and /or parents family   Patient to  follow up with the PCP in the next 2-3 days  Return for any worsening symptoms or go to the ER for further evaluation. Patient/family/caregiver  understands return   precautions and discharge instructions and is agreeable to the current plan   Impression: Asthmatic bronchitis/bronchospasms        Orders and Diagnoses for this visit    Orders and Diagnoses  There are no diagnoses linked to this encounter.    Medical Admin Record      Patient disposition: Home    Electronically signed by Marley Crespo PA-C  10:09 AM      "

## 2025-02-12 NOTE — PROGRESS NOTES
"History Of Present Illness  Sanford Jones is a 49 y.o. male presenting to GI clinic for follow up GERD, diverticulosis.     No chief complaint on file.          Social History  He reports that he has quit smoking. His smoking use included cigarettes. He started smoking about 37 years ago. He has a 37.1 pack-year smoking history. He has been exposed to tobacco smoke. He has quit using smokeless tobacco. He reports current alcohol use. He reports that he does not use drugs.  He {NSAIDS (Optional):18005} on a regular basis    Family History  Family History   Problem Relation Name Age of Onset    Diabetes type II Father      Long QT syndrome Son      Other (monoallelic deletion in KCNQ1 gene) Son      Other (monoallelic mutation of [id number]) Son      Other (nonalcoholic steatohepatitis) Son      Diabetes Son      Diabetes type II Paternal Grandfather       The patient does not have a FH of CRC. he does not have a FH of IBD    Review of Systems      Physical Exam     Last Vital Signs  There were no vitals taken for this visit.     Relevant Results  Lab Results   Component Value Date    WBC 9.2 07/12/2023    HGB 14.1 07/12/2023    HCT 41.6 07/12/2023    MCV 87 07/12/2023     07/12/2023      Lab Results   Component Value Date    GLUCOSE 108 (H) 02/04/2023    CALCIUM 9.1 02/04/2023     02/04/2023    K 4.2 02/04/2023    CO2 28 02/04/2023     02/04/2023    BUN 16 02/04/2023    CREATININE 0.95 02/04/2023      Lab Results   Component Value Date    ALT 58 (H) 02/04/2023    AST 34 02/04/2023    ALKPHOS 74 02/04/2023    BILITOT 0.4 02/04/2023    INR 1.0 02/04/2023      Lab Results   Component Value Date    IVVBQZMN50 409 05/03/2022       Lab Results   Component Value Date    CRP 0.41 02/23/2023    No results found for: \"LIPASE\"   Lab Results   Component Value Date    HGBA1C 6.6 (H) 03/26/2022    HGBA1C 6.4 03/02/2022        EGD/COLONOSCOPY/COLOGUARD  EGD with BRAVO 9/16/2024 with Dr. Mccann-Findings  Mild, " localized salmon-colored mucosa measuring 10 mm in the GE junction (GE junction); no bleeding was identified; performed 4 cold forceps biopsies to rule out Lunsford's esophagus  Mild, localized edematous, erythematous and granular mucosa in the antrum and prepyloric region, consistent with gastritis; no bleeding was identified; performed 4 cold forceps biopsies to rule out H. pylori  Mild, localized ulcerated mucosa in the duodenal bulb; no bleeding was identified; performed cold forceps biopsy  The upper third of the esophagus, middle third of the esophagus, lower third of the esophagus, fundus of the stomach, body of the stomach and 2nd part of the duodenum appeared normal.  Small sliding hiatal hernia (type I hiatal hernia) without Emmanuel lesions present - GE junction 42 cm from the incisors, confirmed by retroflexion. Hill grade IV hiatal hernia  The GE junction was visualized. A pH capsule was placed successfully in the esophagus (36 cm from the incisors, 6 cm from the GE junction). Bravo catheter visualized in esophagus prior to deployment.  Bravo capsule confirmed deployed in esophagus.  FINAL DIAGNOSIS   A.  Duodenum, ulcer, biopsy:  -Mild active duodenitis with epithelial reactive features.     B.  Stomach, biopsy:  -Antral and oxyntic mucosa with mild chronic inflammation and proton pump inhibitor-like effect.  -No H. pylori identified.     C.  Esophagogastric junction, biopsy:  -Squamocolumnar mucosa with focal intestinal metaplasia and chronic inflammation.  -No dysplasia identified.  -See comment.     Comment:  Part C: If intestinal metaplasia is confined to esophagogastric junction, it is not diagnostic of Mic's esophagus.        BRAVO- Total time of study: 75 hours and 44 minutes - It appears that the chip fell out of the esophagus into the stomach on day 3.  Total acid exposure time: 20.1% (normal<4.9)  Upright acid exposure time: 10.2% (normal < 7.3)  Supine acid exposure time: 44.5% (normal <  1.4)    DeMeester score: 89.2 (normal < 14.7) - 69.85 based on day 1 and 2    Number of refluxes total: 149  Number of long refluxes: 35    Symptom association:  SI for heartburn, chest pain, and regurgitation: 52.9, 14.3, and 33.3 respectively (significant for heartburn only)  SAP for heartburn, chest pain, and regurgitation: 98.9, 68.6, and 91.1 respectively (significant for heartburn and regurgitation)    Conclusions:   Bravo pH study shows abnormal amount of acid reflux in esophagus. The patients symptoms correlate with acid reflux episodes. Predominantly supine reflux. Frequent post-prandial reflux.      Colonoscopy 9/16/2-24 with Dr. Mccann-Findings  Multiple small, medium and large, extensive diverticula of moderate severity with no inflammation containing no content in the cecum, ascending colon, transverse colon, descending colon and sigmoid colon; no bleeding was identified  The ileocecal valve, cecum, ascending colon, hepatic flexure, transverse colon, splenic flexure, descending colon, sigmoid colon, rectosigmoid and rectum appeared normal.     EGD 4/9/2021 with Dr. Arreola [facility]-mild antral gastritis, no H. pylori noted on biopsies, small sliding hiatal hernia.  Normal esophagus, normal duodenum to D3     Colonoscopy 4/9/2021 with Dr. Arreola- normal terminal ileum, diminutive cecal tubular adenoma, scattered diverticulosis, more extensive diverticulosis throughout sigmoid colon.  Prolapsed mucosa noted in several segments of sigmoid colon along with punctate erythema in the folds within sigmoid colon around diverticulum, no signs of acute diverticulitis.      EGD 12/27/2018- Impression:            - LA Grade B reflux esophagitis. Biopsied.   - 4 cm hiatal hernia.                        - Normal examined duodenum.                        - Gastritis. Biopsied.      Colonoscopy 12/27/2018 with Dr. Pacheco-Impression:            - One 4 mm polyp at the recto-sigmoid colon, removed with a cold snare. Resected and  retrieved.   - Moderate diverticulosis in the sigmoid colon, in the descending colon and in the cecum. There was narrowing of the colon in association with the diverticular opening. There was evidence of an impacted diverticulum. Petechia were visualized in association with the diverticular opening. There was no evidence of diverticular bleeding.   - The examined portion of the ileum was normal.      1. Gastric antrum/body, biopsy (A) - Oxyntic and antral   mucosa with no significant diagnostic alteration. - No morphologic evidence   of Helicobacter pylori organisms.     2. Distal esophagus, biopsy (B) -   Esophagitis with fungal elements morphologically consistent with Candida   species and admixed bacterial colonies. - Scant glandular epithelium,   negative for intestinal metaplasia.    3. Rectal, sigmoid polyp, biopsy (C) -   Sessile serrated polyp.       IMAGING  CT ABDOMEN PELVIS W IV CONTRAST 3/2/2021  IMPRESSION:  Diffuse colonic diverticulosis and suspect mild diverticulitis distal  descending colon close to its junction with sigmoid colon.  No free intraperitoneal air or abscess formation.  Diffuse hepatic steatosis.    THIS REPORT NEEDS PHYSICIAN'S ATTENTION.  REPORT TO BE CALLED TO 'S OFFICE.     === 05/27/16 ===  CT ABDOMEN PELVIS W CONTRAST  - Impression -  No evidence of an acute appendicitis.     Extensive sigmoid diverticulosis is seen without definite acute diverticulitis.  There is resolution of previously visualized pericolonic stranding.  Slight increase in vascularity of the proximal sigmoid colon is seen which is likely a normal finding, but if patient has persistent pain, recommend follow-up CT examination to exclude developing diverticulitis.     Apparent thickening of the urinary bladder wall may be secondary to partial decompressed status.  However, recommend correlation with urinalysis to exclude cystitis.     === 06/13/12 ===  CT ABDOMEN PELVIS W CONTRAST  - Impression -  1. Slight  interval improvement in wall thickening and pericolonic inflammatory stranding involving the mid sigmoid colon, consistent in appearance with acute diverticulitis.     2. No evidence of perforation or abscess formation.       Assessment/Plan   Assessment & Plan             ABILIO Portillo-SB  02/12/25    of an acute appendicitis.     Extensive sigmoid diverticulosis is seen without definite acute diverticulitis.  There is resolution of previously visualized pericolonic stranding.  Slight increase in vascularity of the proximal sigmoid colon is seen which is likely a normal finding, but if patient has persistent pain, recommend follow-up CT examination to exclude developing diverticulitis.     Apparent thickening of the urinary bladder wall may be secondary to partial decompressed status.  However, recommend correlation with urinalysis to exclude cystitis.     === 06/13/12 ===  CT ABDOMEN PELVIS W CONTRAST  - Impression -  1. Slight interval improvement in wall thickening and pericolonic inflammatory stranding involving the mid sigmoid colon, consistent in appearance with acute diverticulitis.     2. No evidence of perforation or abscess formation.       Assessment/Plan   Assessment & Plan  Diverticulosis  Recent LLQ pain x 1 week, may be diverticular spasm, improved  Trial hyoscyamine 4 times daily as needed  Start Metamucil capsules, start with 3 daily, titrate to 5 daily if not getting adequate fiber intake through diet  Increase water intake, encouraged to try to drink more while he is at home  Start align capsule by mouth once daily  Eat probiotic rich foods including sauerkraut  Orders:    Bifidobacterium infantis (ALIGN) 4 mg capsule; Take 1 capsule (4 mg) by mouth once daily.    psyllium (MetamuciL) 0.4 gram capsule; Take 3-5 capsules by mouth once daily.    hyoscyamine 0.125 mg disintegrating tablet; Place 1 tablet (0.125 mg) under the tongue every 4 hours if needed (abdominal pain, bloating, and/or diarrhea).    Gastroesophageal reflux disease with esophagitis without hemorrhage  Squamocolumnar mucosa with focal intestinal metaplasia and chronic inflammation to GE junction, duodenitis noted on EGD despite twice daily PPI  Bravo with abnormal amount acid exposure, DeMeester score 89.2  Continue pantoprazole  daily, lifestyle modifications  He would like to discuss antireflux surgery with Dr. Mccann, would like to hold off on testing until he meets with her  Smoking cessation  Orders:    pantoprazole (ProtoNix) 40 mg EC tablet; Take 1 tablet (40 mg) by mouth 2 times a day before meals.    Follow-up yearly and as needed        ABILIO Portillo-SB  02/17/25

## 2025-02-17 ENCOUNTER — APPOINTMENT (OUTPATIENT)
Dept: GASTROENTEROLOGY | Facility: CLINIC | Age: 50
End: 2025-02-17
Payer: COMMERCIAL

## 2025-02-17 VITALS
RESPIRATION RATE: 20 BRPM | HEART RATE: 85 BPM | WEIGHT: 185.3 LBS | OXYGEN SATURATION: 97 % | SYSTOLIC BLOOD PRESSURE: 105 MMHG | DIASTOLIC BLOOD PRESSURE: 70 MMHG | HEIGHT: 72 IN | BODY MASS INDEX: 25.1 KG/M2

## 2025-02-17 DIAGNOSIS — K57.90 DIVERTICULOSIS: Primary | ICD-10-CM

## 2025-02-17 DIAGNOSIS — K21.00 GASTROESOPHAGEAL REFLUX DISEASE WITH ESOPHAGITIS WITHOUT HEMORRHAGE: ICD-10-CM

## 2025-02-17 PROCEDURE — 99214 OFFICE O/P EST MOD 30 MIN: CPT

## 2025-02-17 PROCEDURE — 3008F BODY MASS INDEX DOCD: CPT

## 2025-02-17 RX ORDER — HYOSCYAMINE SULFATE 0.12 MG/1
0.12 TABLET, ORALLY DISINTEGRATING ORAL EVERY 4 HOURS PRN
Qty: 120 TABLET | Refills: 2 | Status: SHIPPED | OUTPATIENT
Start: 2025-02-17 | End: 2025-05-18

## 2025-02-17 RX ORDER — PSYLLIUM HUSK 0.4 G
3-5 CAPSULE ORAL DAILY
Qty: 150 CAPSULE | Refills: 3 | Status: SHIPPED | OUTPATIENT
Start: 2025-02-17 | End: 2026-02-17

## 2025-02-17 RX ORDER — PANTOPRAZOLE SODIUM 40 MG/1
40 TABLET, DELAYED RELEASE ORAL
Qty: 180 TABLET | Refills: 3 | Status: SHIPPED | OUTPATIENT
Start: 2025-02-17 | End: 2026-02-17

## 2025-02-17 ASSESSMENT — ENCOUNTER SYMPTOMS
VOMITING: 0
CONSTIPATION: 0
ABDOMINAL PAIN: 1
NAUSEA: 0
UNEXPECTED WEIGHT CHANGE: 1
ANAL BLEEDING: 0
DIARRHEA: 0
ROS GI COMMENTS: SEE HPI
BLOOD IN STOOL: 0

## 2025-02-17 ASSESSMENT — PAIN SCALES - GENERAL: PAINLEVEL_OUTOF10: 0-NO PAIN

## 2025-02-17 ASSESSMENT — PATIENT HEALTH QUESTIONNAIRE - PHQ9: 1. LITTLE INTEREST OR PLEASURE IN DOING THINGS: NOT AT ALL

## 2025-02-17 NOTE — ASSESSMENT & PLAN NOTE
Recent LLQ pain x 1 week, may be diverticular spasm, improved  Trial hyoscyamine 4 times daily as needed  Start Metamucil capsules, start with 3 daily, titrate to 5 daily if not getting adequate fiber intake through diet  Increase water intake, encouraged to try to drink more while he is at home  Start align capsule by mouth once daily  Eat probiotic rich foods including sauerkraut  Orders:    Bifidobacterium infantis (ALIGN) 4 mg capsule; Take 1 capsule (4 mg) by mouth once daily.    psyllium (MetamuciL) 0.4 gram capsule; Take 3-5 capsules by mouth once daily.    hyoscyamine 0.125 mg disintegrating tablet; Place 1 tablet (0.125 mg) under the tongue every 4 hours if needed (abdominal pain, bloating, and/or diarrhea).

## 2025-02-17 NOTE — ASSESSMENT & PLAN NOTE
Squamocolumnar mucosa with focal intestinal metaplasia and chronic inflammation to GE junction, duodenitis noted on EGD despite twice daily PPI  Bravo with abnormal amount acid exposure, DeMeester score 89.2  Continue pantoprazole daily, lifestyle modifications  He would like to discuss antireflux surgery with Dr. Mccann, would like to hold off on testing until he meets with her  Smoking cessation  Orders:    pantoprazole (ProtoNix) 40 mg EC tablet; Take 1 tablet (40 mg) by mouth 2 times a day before meals.

## 2025-02-17 NOTE — PATIENT INSTRUCTIONS
Continue the pantoprazole twice daily 30 mins before meals  Make an appointment to see Dr. Mccann to discuss reflux surgery    Start Align probiotic daily  Continue the high fiber diet- consider adding supplemental fiber like psyllium fiber- you can take 3-5 capsules daily. Start with 3 and increase as tolerated. Take with the probiotics    Hyoscyamine to be used as needed for the lower abdominal pain, cramping  Follow up with me yearly and as needed

## 2025-03-08 ENCOUNTER — APPOINTMENT (OUTPATIENT)
Dept: RADIOLOGY | Facility: HOSPITAL | Age: 50
DRG: 190 | End: 2025-03-08
Payer: COMMERCIAL

## 2025-03-08 ENCOUNTER — HOSPITAL ENCOUNTER (INPATIENT)
Facility: HOSPITAL | Age: 50
End: 2025-03-08
Attending: EMERGENCY MEDICINE | Admitting: INTERNAL MEDICINE
Payer: COMMERCIAL

## 2025-03-08 DIAGNOSIS — J06.9 UPPER RESPIRATORY TRACT INFECTION, UNSPECIFIED TYPE: Primary | ICD-10-CM

## 2025-03-08 DIAGNOSIS — J98.01 ACUTE BRONCHOSPASM: ICD-10-CM

## 2025-03-08 DIAGNOSIS — J44.1 COPD EXACERBATION (MULTI): ICD-10-CM

## 2025-03-08 DIAGNOSIS — R06.02 SHORTNESS OF BREATH: ICD-10-CM

## 2025-03-08 DIAGNOSIS — J40 BRONCHITIS: ICD-10-CM

## 2025-03-08 DIAGNOSIS — R09.02 HYPOXIA: ICD-10-CM

## 2025-03-08 PROBLEM — I25.10 ASHD (ARTERIOSCLEROTIC HEART DISEASE): Status: ACTIVE | Noted: 2025-03-08

## 2025-03-08 PROBLEM — J96.01 ACUTE RESPIRATORY FAILURE WITH HYPOXIA (MULTI): Status: ACTIVE | Noted: 2025-03-08

## 2025-03-08 PROBLEM — I10 BENIGN ESSENTIAL HTN: Status: ACTIVE | Noted: 2025-03-08

## 2025-03-08 PROBLEM — U07.0 E-CIGARETTE OR VAPING PRODUCT USE ASSOCIATED LUNG INJURY (EVALI): Status: ACTIVE | Noted: 2025-03-08

## 2025-03-08 LAB
ALBUMIN SERPL BCP-MCNC: 4.5 G/DL (ref 3.4–5)
ALP SERPL-CCNC: 65 U/L (ref 33–120)
ALT SERPL W P-5'-P-CCNC: 49 U/L (ref 10–52)
ANION GAP SERPL CALC-SCNC: 14 MMOL/L (ref 10–20)
AST SERPL W P-5'-P-CCNC: 33 U/L (ref 9–39)
BASOPHILS # BLD AUTO: 0.03 X10*3/UL (ref 0–0.1)
BASOPHILS NFR BLD AUTO: 0.4 %
BILIRUB SERPL-MCNC: 0.4 MG/DL (ref 0–1.2)
BNP SERPL-MCNC: 11 PG/ML (ref 0–99)
BUN SERPL-MCNC: 13 MG/DL (ref 6–23)
CALCIUM SERPL-MCNC: 9.7 MG/DL (ref 8.6–10.3)
CARDIAC TROPONIN I PNL SERPL HS: 5 NG/L (ref 0–20)
CHLORIDE SERPL-SCNC: 103 MMOL/L (ref 98–107)
CO2 SERPL-SCNC: 24 MMOL/L (ref 21–32)
CREAT SERPL-MCNC: 0.81 MG/DL (ref 0.5–1.3)
D DIMER PPP FEU-MCNC: <215 NG/ML FEU
EGFRCR SERPLBLD CKD-EPI 2021: >90 ML/MIN/1.73M*2
EOSINOPHIL # BLD AUTO: 0.07 X10*3/UL (ref 0–0.7)
EOSINOPHIL NFR BLD AUTO: 1 %
ERYTHROCYTE [DISTWIDTH] IN BLOOD BY AUTOMATED COUNT: 13.4 % (ref 11.5–14.5)
FLUAV RNA RESP QL NAA+PROBE: NOT DETECTED
FLUBV RNA RESP QL NAA+PROBE: NOT DETECTED
GLUCOSE SERPL-MCNC: 113 MG/DL (ref 74–99)
HCT VFR BLD AUTO: 42.8 % (ref 41–52)
HGB BLD-MCNC: 14.5 G/DL (ref 13.5–17.5)
IMM GRANULOCYTES # BLD AUTO: 0.01 X10*3/UL (ref 0–0.7)
IMM GRANULOCYTES NFR BLD AUTO: 0.1 % (ref 0–0.9)
LACTATE SERPL-SCNC: 1.4 MMOL/L (ref 0.4–2)
LYMPHOCYTES # BLD AUTO: 1.11 X10*3/UL (ref 1.2–4.8)
LYMPHOCYTES NFR BLD AUTO: 15.4 %
MCH RBC QN AUTO: 30 PG (ref 26–34)
MCHC RBC AUTO-ENTMCNC: 33.9 G/DL (ref 32–36)
MCV RBC AUTO: 88 FL (ref 80–100)
MONOCYTES # BLD AUTO: 0.66 X10*3/UL (ref 0.1–1)
MONOCYTES NFR BLD AUTO: 9.2 %
NEUTROPHILS # BLD AUTO: 5.33 X10*3/UL (ref 1.2–7.7)
NEUTROPHILS NFR BLD AUTO: 73.9 %
NRBC BLD-RTO: 0 /100 WBCS (ref 0–0)
PLATELET # BLD AUTO: 183 X10*3/UL (ref 150–450)
POTASSIUM SERPL-SCNC: 4.4 MMOL/L (ref 3.5–5.3)
PROT SERPL-MCNC: 7.7 G/DL (ref 6.4–8.2)
RBC # BLD AUTO: 4.84 X10*6/UL (ref 4.5–5.9)
SARS-COV-2 RNA RESP QL NAA+PROBE: NOT DETECTED
SODIUM SERPL-SCNC: 137 MMOL/L (ref 136–145)
WBC # BLD AUTO: 7.2 X10*3/UL (ref 4.4–11.3)

## 2025-03-08 PROCEDURE — 71250 CT THORAX DX C-: CPT

## 2025-03-08 PROCEDURE — 85025 COMPLETE CBC W/AUTO DIFF WBC: CPT | Performed by: EMERGENCY MEDICINE

## 2025-03-08 PROCEDURE — 94640 AIRWAY INHALATION TREATMENT: CPT

## 2025-03-08 PROCEDURE — 94760 N-INVAS EAR/PLS OXIMETRY 1: CPT

## 2025-03-08 PROCEDURE — 96372 THER/PROPH/DIAG INJ SC/IM: CPT | Performed by: NURSE PRACTITIONER

## 2025-03-08 PROCEDURE — 2500000004 HC RX 250 GENERAL PHARMACY W/ HCPCS (ALT 636 FOR OP/ED): Mod: JZ,TB | Performed by: EMERGENCY MEDICINE

## 2025-03-08 PROCEDURE — G0378 HOSPITAL OBSERVATION PER HR: HCPCS

## 2025-03-08 PROCEDURE — 83880 ASSAY OF NATRIURETIC PEPTIDE: CPT | Performed by: EMERGENCY MEDICINE

## 2025-03-08 PROCEDURE — 80053 COMPREHEN METABOLIC PANEL: CPT | Performed by: EMERGENCY MEDICINE

## 2025-03-08 PROCEDURE — 2500000002 HC RX 250 W HCPCS SELF ADMINISTERED DRUGS (ALT 637 FOR MEDICARE OP, ALT 636 FOR OP/ED): Performed by: EMERGENCY MEDICINE

## 2025-03-08 PROCEDURE — 83605 ASSAY OF LACTIC ACID: CPT | Performed by: EMERGENCY MEDICINE

## 2025-03-08 PROCEDURE — 36415 COLL VENOUS BLD VENIPUNCTURE: CPT | Performed by: EMERGENCY MEDICINE

## 2025-03-08 PROCEDURE — 2500000005 HC RX 250 GENERAL PHARMACY W/O HCPCS: Performed by: EMERGENCY MEDICINE

## 2025-03-08 PROCEDURE — 2500000004 HC RX 250 GENERAL PHARMACY W/ HCPCS (ALT 636 FOR OP/ED): Performed by: NURSE PRACTITIONER

## 2025-03-08 PROCEDURE — 87636 SARSCOV2 & INF A&B AMP PRB: CPT | Performed by: EMERGENCY MEDICINE

## 2025-03-08 PROCEDURE — 99285 EMERGENCY DEPT VISIT HI MDM: CPT | Mod: 25 | Performed by: EMERGENCY MEDICINE

## 2025-03-08 PROCEDURE — 2500000002 HC RX 250 W HCPCS SELF ADMINISTERED DRUGS (ALT 637 FOR MEDICARE OP, ALT 636 FOR OP/ED): Performed by: NURSE PRACTITIONER

## 2025-03-08 PROCEDURE — 94664 DEMO&/EVAL PT USE INHALER: CPT

## 2025-03-08 PROCEDURE — 87075 CULTR BACTERIA EXCEPT BLOOD: CPT | Mod: GENLAB | Performed by: EMERGENCY MEDICINE

## 2025-03-08 PROCEDURE — 71250 CT THORAX DX C-: CPT | Performed by: RADIOLOGY

## 2025-03-08 PROCEDURE — 71046 X-RAY EXAM CHEST 2 VIEWS: CPT | Mod: FOREIGN READ | Performed by: RADIOLOGY

## 2025-03-08 PROCEDURE — 84484 ASSAY OF TROPONIN QUANT: CPT | Performed by: EMERGENCY MEDICINE

## 2025-03-08 PROCEDURE — 2500000005 HC RX 250 GENERAL PHARMACY W/O HCPCS: Performed by: NURSE PRACTITIONER

## 2025-03-08 PROCEDURE — 85379 FIBRIN DEGRADATION QUANT: CPT | Performed by: EMERGENCY MEDICINE

## 2025-03-08 PROCEDURE — 9420000001 HC RT PATIENT EDUCATION 5 MIN

## 2025-03-08 PROCEDURE — 71046 X-RAY EXAM CHEST 2 VIEWS: CPT

## 2025-03-08 PROCEDURE — 87040 BLOOD CULTURE FOR BACTERIA: CPT | Mod: GENLAB | Performed by: EMERGENCY MEDICINE

## 2025-03-08 RX ORDER — ACETAMINOPHEN 500 MG
5 TABLET ORAL NIGHTLY PRN
Status: ACTIVE | OUTPATIENT
Start: 2025-03-08

## 2025-03-08 RX ORDER — IPRATROPIUM BROMIDE AND ALBUTEROL SULFATE 2.5; .5 MG/3ML; MG/3ML
3 SOLUTION RESPIRATORY (INHALATION) ONCE
Status: COMPLETED | OUTPATIENT
Start: 2025-03-08 | End: 2025-03-08

## 2025-03-08 RX ORDER — AMOXICILLIN 250 MG
1 CAPSULE ORAL NIGHTLY PRN
Status: ACTIVE | OUTPATIENT
Start: 2025-03-08

## 2025-03-08 RX ORDER — PANTOPRAZOLE SODIUM 40 MG/10ML
40 INJECTION, POWDER, LYOPHILIZED, FOR SOLUTION INTRAVENOUS
Status: DISCONTINUED | OUTPATIENT
Start: 2025-03-09 | End: 2025-03-08

## 2025-03-08 RX ORDER — ENOXAPARIN SODIUM 100 MG/ML
40 INJECTION SUBCUTANEOUS EVERY 24 HOURS
Status: DISPENSED | OUTPATIENT
Start: 2025-03-08

## 2025-03-08 RX ORDER — AMLODIPINE BESYLATE 5 MG/1
5 TABLET ORAL DAILY
Status: DISPENSED | OUTPATIENT
Start: 2025-03-08

## 2025-03-08 RX ORDER — ACETAMINOPHEN 325 MG/1
650 TABLET ORAL EVERY 4 HOURS PRN
Status: ACTIVE | OUTPATIENT
Start: 2025-03-08

## 2025-03-08 RX ORDER — PANTOPRAZOLE SODIUM 40 MG/1
40 TABLET, DELAYED RELEASE ORAL
Status: DISPENSED | OUTPATIENT
Start: 2025-03-09

## 2025-03-08 RX ORDER — GUAIFENESIN/DEXTROMETHORPHAN 100-10MG/5
5 SYRUP ORAL EVERY 4 HOURS PRN
Status: ACTIVE | OUTPATIENT
Start: 2025-03-08

## 2025-03-08 RX ORDER — ASPIRIN 81 MG/1
81 TABLET ORAL DAILY
Status: DISPENSED | OUTPATIENT
Start: 2025-03-08

## 2025-03-08 RX ORDER — LEVOTHYROXINE SODIUM 125 UG/1
125 TABLET ORAL DAILY
Status: DISPENSED | OUTPATIENT
Start: 2025-03-08

## 2025-03-08 RX ORDER — GUAIFENESIN 600 MG/1
600 TABLET, EXTENDED RELEASE ORAL EVERY 12 HOURS PRN
Status: ACTIVE | OUTPATIENT
Start: 2025-03-08

## 2025-03-08 RX ORDER — FLUTICASONE FUROATE AND VILANTEROL 200; 25 UG/1; UG/1
1 POWDER RESPIRATORY (INHALATION) DAILY
Status: DISPENSED | OUTPATIENT
Start: 2025-03-08

## 2025-03-08 RX ORDER — IPRATROPIUM BROMIDE AND ALBUTEROL SULFATE 2.5; .5 MG/3ML; MG/3ML
3 SOLUTION RESPIRATORY (INHALATION) 3 TIMES DAILY
Status: DISPENSED | OUTPATIENT
Start: 2025-03-08

## 2025-03-08 RX ORDER — ONDANSETRON 4 MG/1
4 TABLET, FILM COATED ORAL EVERY 8 HOURS PRN
Status: DISCONTINUED | OUTPATIENT
Start: 2025-03-08 | End: 2025-03-09

## 2025-03-08 RX ORDER — ONDANSETRON HYDROCHLORIDE 2 MG/ML
4 INJECTION, SOLUTION INTRAVENOUS EVERY 8 HOURS PRN
Status: ACTIVE | OUTPATIENT
Start: 2025-03-08

## 2025-03-08 RX ORDER — CALCIUM CARBONATE 200(500)MG
500 TABLET,CHEWABLE ORAL 4 TIMES DAILY PRN
Status: ACTIVE | OUTPATIENT
Start: 2025-03-08

## 2025-03-08 RX ADMIN — IPRATROPIUM BROMIDE AND ALBUTEROL SULFATE 3 ML: .5; 3 SOLUTION RESPIRATORY (INHALATION) at 16:24

## 2025-03-08 RX ADMIN — ENOXAPARIN SODIUM 40 MG: 40 INJECTION SUBCUTANEOUS at 16:07

## 2025-03-08 RX ADMIN — Medication 2 L/MIN: at 11:57

## 2025-03-08 RX ADMIN — IPRATROPIUM BROMIDE AND ALBUTEROL SULFATE 3 ML: .5; 3 SOLUTION RESPIRATORY (INHALATION) at 09:29

## 2025-03-08 RX ADMIN — METHYLPREDNISOLONE SODIUM SUCCINATE 40 MG: 40 INJECTION, POWDER, FOR SOLUTION INTRAMUSCULAR; INTRAVENOUS at 23:54

## 2025-03-08 RX ADMIN — IPRATROPIUM BROMIDE AND ALBUTEROL SULFATE 3 ML: .5; 3 SOLUTION RESPIRATORY (INHALATION) at 21:11

## 2025-03-08 RX ADMIN — METHYLPREDNISOLONE SODIUM SUCCINATE 125 MG: 125 INJECTION, POWDER, FOR SOLUTION INTRAMUSCULAR; INTRAVENOUS at 09:31

## 2025-03-08 RX ADMIN — Medication 2 L/MIN: at 21:11

## 2025-03-08 SDOH — ECONOMIC STABILITY: INCOME INSECURITY: IN THE PAST 12 MONTHS HAS THE ELECTRIC, GAS, OIL, OR WATER COMPANY THREATENED TO SHUT OFF SERVICES IN YOUR HOME?: NO

## 2025-03-08 SDOH — SOCIAL STABILITY: SOCIAL INSECURITY
WITHIN THE LAST YEAR, HAVE YOU BEEN KICKED, HIT, SLAPPED, OR OTHERWISE PHYSICALLY HURT BY YOUR PARTNER OR EX-PARTNER?: NO

## 2025-03-08 SDOH — ECONOMIC STABILITY: FOOD INSECURITY: WITHIN THE PAST 12 MONTHS, YOU WORRIED THAT YOUR FOOD WOULD RUN OUT BEFORE YOU GOT THE MONEY TO BUY MORE.: NEVER TRUE

## 2025-03-08 SDOH — SOCIAL STABILITY: SOCIAL INSECURITY: ABUSE: ADULT

## 2025-03-08 SDOH — SOCIAL STABILITY: SOCIAL INSECURITY
WITHIN THE LAST YEAR, HAVE YOU BEEN RAPED OR FORCED TO HAVE ANY KIND OF SEXUAL ACTIVITY BY YOUR PARTNER OR EX-PARTNER?: NO

## 2025-03-08 SDOH — SOCIAL STABILITY: SOCIAL INSECURITY: HAVE YOU HAD THOUGHTS OF HARMING ANYONE ELSE?: NO

## 2025-03-08 SDOH — HEALTH STABILITY: MENTAL HEALTH
DO YOU FEEL STRESS - TENSE, RESTLESS, NERVOUS, OR ANXIOUS, OR UNABLE TO SLEEP AT NIGHT BECAUSE YOUR MIND IS TROUBLED ALL THE TIME - THESE DAYS?: NOT AT ALL

## 2025-03-08 SDOH — SOCIAL STABILITY: SOCIAL INSECURITY: DO YOU FEEL UNSAFE GOING BACK TO THE PLACE WHERE YOU ARE LIVING?: NO

## 2025-03-08 SDOH — SOCIAL STABILITY: SOCIAL INSECURITY: DOES ANYONE TRY TO KEEP YOU FROM HAVING/CONTACTING OTHER FRIENDS OR DOING THINGS OUTSIDE YOUR HOME?: NO

## 2025-03-08 SDOH — SOCIAL STABILITY: SOCIAL INSECURITY: WERE YOU ABLE TO COMPLETE ALL THE BEHAVIORAL HEALTH SCREENINGS?: YES

## 2025-03-08 SDOH — SOCIAL STABILITY: SOCIAL INSECURITY: WITHIN THE LAST YEAR, HAVE YOU BEEN AFRAID OF YOUR PARTNER OR EX-PARTNER?: NO

## 2025-03-08 SDOH — SOCIAL STABILITY: SOCIAL INSECURITY: ARE THERE ANY APPARENT SIGNS OF INJURIES/BEHAVIORS THAT COULD BE RELATED TO ABUSE/NEGLECT?: NO

## 2025-03-08 SDOH — SOCIAL STABILITY: SOCIAL INSECURITY: WITHIN THE LAST YEAR, HAVE YOU BEEN HUMILIATED OR EMOTIONALLY ABUSED IN OTHER WAYS BY YOUR PARTNER OR EX-PARTNER?: NO

## 2025-03-08 SDOH — SOCIAL STABILITY: SOCIAL INSECURITY: HAS ANYONE EVER THREATENED TO HURT YOUR FAMILY OR YOUR PETS?: NO

## 2025-03-08 SDOH — ECONOMIC STABILITY: FOOD INSECURITY: WITHIN THE PAST 12 MONTHS, THE FOOD YOU BOUGHT JUST DIDN'T LAST AND YOU DIDN'T HAVE MONEY TO GET MORE.: NEVER TRUE

## 2025-03-08 SDOH — SOCIAL STABILITY: SOCIAL INSECURITY: HAVE YOU HAD ANY THOUGHTS OF HARMING ANYONE ELSE?: NO

## 2025-03-08 SDOH — SOCIAL STABILITY: SOCIAL INSECURITY: DO YOU FEEL ANYONE HAS EXPLOITED OR TAKEN ADVANTAGE OF YOU FINANCIALLY OR OF YOUR PERSONAL PROPERTY?: NO

## 2025-03-08 SDOH — SOCIAL STABILITY: SOCIAL INSECURITY: ARE YOU OR HAVE YOU BEEN THREATENED OR ABUSED PHYSICALLY, EMOTIONALLY, OR SEXUALLY BY ANYONE?: NO

## 2025-03-08 ASSESSMENT — LIFESTYLE VARIABLES
PRESCIPTION_ABUSE_PAST_12_MONTHS: NO
SKIP TO QUESTIONS 9-10: 1
HOW OFTEN DO YOU HAVE A DRINK CONTAINING ALCOHOL: MONTHLY OR LESS
HOW OFTEN DO YOU HAVE 6 OR MORE DRINKS ON ONE OCCASION: NEVER
HOW MANY STANDARD DRINKS CONTAINING ALCOHOL DO YOU HAVE ON A TYPICAL DAY: 1 OR 2
AUDIT-C TOTAL SCORE: 1
SUBSTANCE_ABUSE_PAST_12_MONTHS: NO
AUDIT-C TOTAL SCORE: 1

## 2025-03-08 ASSESSMENT — ENCOUNTER SYMPTOMS
SORE THROAT: 1
APPETITE CHANGE: 0
ACTIVITY CHANGE: 1
MUSCULOSKELETAL NEGATIVE: 1
CARDIOVASCULAR NEGATIVE: 1
UNEXPECTED WEIGHT CHANGE: 0
RHINORRHEA: 1
NEUROLOGICAL NEGATIVE: 1
SHORTNESS OF BREATH: 1
ENDOCRINE NEGATIVE: 1
CHILLS: 1
PSYCHIATRIC NEGATIVE: 1
FEVER: 1
COUGH: 1
FATIGUE: 0
EYES NEGATIVE: 1
GASTROINTESTINAL NEGATIVE: 1
WHEEZING: 1
DIAPHORESIS: 0

## 2025-03-08 ASSESSMENT — ACTIVITIES OF DAILY LIVING (ADL)
PATIENT'S MEMORY ADEQUATE TO SAFELY COMPLETE DAILY ACTIVITIES?: YES
WALKS IN HOME: INDEPENDENT
DRESSING YOURSELF: INDEPENDENT
JUDGMENT_ADEQUATE_SAFELY_COMPLETE_DAILY_ACTIVITIES: YES
ADEQUATE_TO_COMPLETE_ADL: YES
GROOMING: INDEPENDENT
BATHING: INDEPENDENT
HEARING - RIGHT EAR: FUNCTIONAL
TOILETING: INDEPENDENT
LACK_OF_TRANSPORTATION: NO
FEEDING YOURSELF: INDEPENDENT
HEARING - LEFT EAR: FUNCTIONAL

## 2025-03-08 ASSESSMENT — PAIN SCALES - GENERAL
PAINLEVEL_OUTOF10: 4
PAINLEVEL_OUTOF10: 0 - NO PAIN

## 2025-03-08 ASSESSMENT — PAIN DESCRIPTION - PAIN TYPE: TYPE: ACUTE PAIN

## 2025-03-08 ASSESSMENT — PAIN DESCRIPTION - LOCATION: LOCATION: CHEST

## 2025-03-08 ASSESSMENT — COLUMBIA-SUICIDE SEVERITY RATING SCALE - C-SSRS
2. HAVE YOU ACTUALLY HAD ANY THOUGHTS OF KILLING YOURSELF?: NO
1. IN THE PAST MONTH, HAVE YOU WISHED YOU WERE DEAD OR WISHED YOU COULD GO TO SLEEP AND NOT WAKE UP?: NO
6. HAVE YOU EVER DONE ANYTHING, STARTED TO DO ANYTHING, OR PREPARED TO DO ANYTHING TO END YOUR LIFE?: NO

## 2025-03-08 ASSESSMENT — COGNITIVE AND FUNCTIONAL STATUS - GENERAL
PATIENT BASELINE BEDBOUND: NO
MOBILITY SCORE: 18
WALKING IN HOSPITAL ROOM: A LITTLE
MOVING FROM LYING ON BACK TO SITTING ON SIDE OF FLAT BED WITH BEDRAILS: A LITTLE
DRESSING REGULAR LOWER BODY CLOTHING: A LITTLE
HELP NEEDED FOR BATHING: A LITTLE
STANDING UP FROM CHAIR USING ARMS: A LITTLE
DAILY ACTIVITIY SCORE: 20
TOILETING: A LITTLE
DRESSING REGULAR UPPER BODY CLOTHING: A LITTLE
MOVING TO AND FROM BED TO CHAIR: A LITTLE
CLIMB 3 TO 5 STEPS WITH RAILING: A LITTLE
TURNING FROM BACK TO SIDE WHILE IN FLAT BAD: A LITTLE

## 2025-03-08 ASSESSMENT — PAIN - FUNCTIONAL ASSESSMENT
PAIN_FUNCTIONAL_ASSESSMENT: 0-10
PAIN_FUNCTIONAL_ASSESSMENT: 0-10

## 2025-03-08 ASSESSMENT — PAIN DESCRIPTION - DESCRIPTORS: DESCRIPTORS: DISCOMFORT

## 2025-03-08 ASSESSMENT — PAIN DESCRIPTION - ONSET: ONSET: ONGOING

## 2025-03-08 ASSESSMENT — PATIENT HEALTH QUESTIONNAIRE - PHQ9
2. FEELING DOWN, DEPRESSED OR HOPELESS: NOT AT ALL
1. LITTLE INTEREST OR PLEASURE IN DOING THINGS: NOT AT ALL
SUM OF ALL RESPONSES TO PHQ9 QUESTIONS 1 & 2: 0

## 2025-03-08 ASSESSMENT — PAIN DESCRIPTION - ORIENTATION: ORIENTATION: MID

## 2025-03-08 ASSESSMENT — PAIN DESCRIPTION - FREQUENCY: FREQUENCY: CONSTANT/CONTINUOUS

## 2025-03-08 ASSESSMENT — PAIN DESCRIPTION - PROGRESSION: CLINICAL_PROGRESSION: NOT CHANGED

## 2025-03-08 NOTE — ED PROVIDER NOTES
HPI   Chief Complaint   Patient presents with    Shortness of Breath    Cough       HPI  Patient is a 49-year-old male presenting to the ED today for cough, congestion, sore throat, and shortness of breath.  Patient states that he has had intermittent URI symptoms for the past 3 months.  He has been on 2 separate courses of antibiotics and steroids.  His most recent course was completed approximately 3 weeks ago.  He states that shortly after being on the medications, he tends to feel better.  However, his symptoms then return.  Over the past few days, he has again had worsening cough and shortness of breath.  He also reports a fever of 100.9 last night.  He has an albuterol inhaler that was previously prescribed from the urgent care that he has been using with minimal relief.  He denies a history of COPD.  He does not require any supplemental oxygen at home.  He reports smoking about 3 cigarettes a day.  He has an upcoming appointment with his PCP later this month.  He otherwise denies any chest pain, abdominal pain, vomiting, or changes in bowel or bladder habits.      Patient History   Past Medical History:   Diagnosis Date    Awareness under anesthesia     GERD (gastroesophageal reflux disease)     Hyperlipidemia     Hypertension      Past Surgical History:   Procedure Laterality Date    COLONOSCOPY      ESOPHAGOGASTRODUODENOSCOPY       Family History   Problem Relation Name Age of Onset    Diabetes type II Father      Long QT syndrome Son      Other (monoallelic deletion in KCNQ1 gene) Son      Other (monoallelic mutation of [id number]) Son      Other (nonalcoholic steatohepatitis) Son      Diabetes Son      Diabetes type II Paternal Grandfather       Social History     Tobacco Use    Smoking status: Every Day     Current packs/day: 1.00     Average packs/day: 1 pack/day for 37.2 years (37.2 ttl pk-yrs)     Types: Cigarettes     Start date: 1988     Passive exposure: Current    Smokeless tobacco: Former    Tobacco  comments:     5 cigarettes a day.    Vaping Use    Vaping status: Every Day    Substances: Nicotine    Devices: Refillable tank   Substance Use Topics    Alcohol use: Yes     Comment: 1 x week    Drug use: Never       Physical Exam   ED Triage Vitals [03/08/25 0856]   Temperature Heart Rate Respirations BP   37.1 °C (98.8 °F) (!) 103 (!) 21 124/75      Pulse Ox Temp Source Heart Rate Source Patient Position   (!) 92 % Oral Monitor Sitting      BP Location FiO2 (%)     Left arm --       Physical Exam  Vitals and nursing note reviewed.   Constitutional:       Appearance: He is not toxic-appearing.   HENT:      Head: Normocephalic.      Mouth/Throat:      Mouth: Mucous membranes are moist.   Eyes:      Extraocular Movements: Extraocular movements intact.      Conjunctiva/sclera: Conjunctivae normal.   Cardiovascular:      Rate and Rhythm: Regular rhythm. Tachycardia present.      Pulses: Normal pulses.   Pulmonary:      Comments: Tachypneic, otherwise no significant increased work of breathing.  Expiratory wheezing present throughout all lung fields.  Intermittent rhonchi present.  Abdominal:      General: There is no distension.      Palpations: Abdomen is soft.      Tenderness: There is no abdominal tenderness.   Musculoskeletal:         General: No swelling.      Cervical back: Neck supple.   Skin:     General: Skin is warm and dry.      Capillary Refill: Capillary refill takes less than 2 seconds.   Neurological:      General: No focal deficit present.      Mental Status: He is alert. Mental status is at baseline.           ED Course & MDM   ED Course as of 03/08/25 1403   Sat Mar 08, 2025   0958 EKG obtained at 904, interpreted by myself.  Normal sinus rhythm with a ventricular rate of 98, no axis deviation, normal intervals, with no acute ischemic changes [VT]      ED Course User Index  [VT] Sharon BRAMBILA MD         Diagnoses as of 03/08/25 1403   Upper respiratory tract infection, unspecified type   Bronchitis    Shortness of breath   Hypoxia           No data recorded     Cris Coma Scale Score: 15 (03/08/25 0912 : Maria Alejandra Geiger, AUTUMN)                       Medical Decision Making  Patient was seen and evaluated for cough, congestion, shortness of breath.  Differential diagnosis includes but is not limited to pneumonia, pneumothorax, COPD exacerbation, CHF exacerbation, PE, ACS, URI, Viral illness, Anemia, Electrolyte abnormality.  On arrival, patient is oxygenating around 92% on room air.  Patient is placed on a cardiac monitor with continuous pulse ox.  Additional labs and imaging are ordered for further evaluation of the patient's symptoms.    CBC is unremarkable.  CMP is unremarkable.  D-dimer is negative.  High sensitive troponin is normal at 5.  BNP is normal at 11.  Lactic acid is normal at 1.4.  Influenza and COVID swabs are negative.  XR chest 2 views   Final Result   No acute cardiopulmonary disease.   Signed by Mookie Nielson DO        On reevaluation, patient is resting comfortably.  He states that he feels better at this time.  He continues oxygenating around 92-94% on room air.  On ambulation however, patient's SpO2 drops to 90%.  Patient is therefore placed on 2L nasal cannula. Patient was informed of their lab and imaging results, and all questions and concerns were answered. Admission planning for further management was discussed at this time, to which the patient was agreeable.  I discussed the case with Cindy Gutierrez, DAWIT on-call for the hospitalist service, who accepts patient for admission under Dr. Pond.    Procedure  Procedures     Sharon BRAMBILA MD  03/08/25 9275

## 2025-03-08 NOTE — LETTER
March 10, 2025     Patient: Sanford Jones   YOB: 1975   Date of Visit: 3/8/2025       To Whom It May Concern:    Sanford Jones was admitted on 3/8/2025 and discharged on 3/10/25 . Please excuse Sanford for his absence from work. He may return to work on 3/11/25 with no restrictions.    If you have any questions or concerns, please don't hesitate to call.         Sincerely,         ABILIO Vazquez-SB  Hospitalist        CC: No Recipients

## 2025-03-08 NOTE — CARE PLAN
The clinical goals for the shift include Patient will maintain SPO2 at or avove 92% this shift.    Since arrival to med surg at 1446 patient was oriented to room and call light. SPO2 remained above 92% while on 2L of O2. All vitals stable. No reports of pain. Appetite was good. Patient has been ambulating to bathroom independently. Currently lying in bed with call light in reach. Denies any needs at this time.

## 2025-03-08 NOTE — ED TRIAGE NOTES
Pt arrives ambulatory to [facility], presenting with SOB, and a productive cough hat has been ongoing for a week. Pt states he was diagnosed with pneumonia, prescribed antibiotics and steroids, and has not noticed a relief. Pt reports mild discomfort in his chest from coughing. Pt A&Ox3, resp labored, worse with coughing or exertion, skin flushed.

## 2025-03-08 NOTE — H&P
History Of Present Illness  Sanford Jones is a 49 y.o. male presenting with a complaint of SOB. He has had intermittent URI for the past 3 months. He has been on 2 separate courses of antibiotic and steroids. His most recent course was completed 3 weeks ago. Over the past 3 days he has had cough, SOB, fever and chills. He denies chest pain, N/V/D/C.    In the ED:  VS: T 37.1; ; R 21; /75; SpO2 92% on RA  Lab: Glu 113; Na 137; K 4.4; BuN 13; Cr 0.81; WBC 7.2; Hb 14.5 Hcrt 42.8;   Radiology: CXR No acute cardiopulmonary disease   Medication: None  Disposition: admitted to med/surg     Past Medical History  Past Medical History:   Diagnosis Date    Awareness under anesthesia     GERD (gastroesophageal reflux disease)     Hyperlipidemia     Hypertension        Surgical History  Past Surgical History:   Procedure Laterality Date    COLONOSCOPY      ESOPHAGOGASTRODUODENOSCOPY          Social History  He reports that he has been smoking cigarettes. He started smoking about 37 years ago. He has a 37.2 pack-year smoking history. He has been exposed to tobacco smoke. He has quit using smokeless tobacco. He reports current alcohol use. He reports that he does not use drugs.    Family History  Family History   Problem Relation Name Age of Onset    Diabetes type II Father      Long QT syndrome Son      Other (monoallelic deletion in KCNQ1 gene) Son      Other (monoallelic mutation of [id number]) Son      Other (nonalcoholic steatohepatitis) Son      Diabetes Son      Diabetes type II Paternal Grandfather          Allergies  House dust mite    Review of Systems   Constitutional:  Positive for activity change, chills and fever. Negative for appetite change, diaphoresis, fatigue and unexpected weight change.   HENT:  Positive for congestion, postnasal drip, rhinorrhea, sneezing and sore throat.    Eyes: Negative.    Respiratory:  Positive for cough, shortness of breath and wheezing.    Cardiovascular:  Negative.    Gastrointestinal: Negative.    Endocrine: Negative.    Genitourinary: Negative.    Musculoskeletal: Negative.    Skin: Negative.    Neurological: Negative.    Psychiatric/Behavioral: Negative.          Physical Exam  Vitals reviewed.   Constitutional:       Appearance: Normal appearance. He is normal weight.   HENT:      Head: Normocephalic and atraumatic.      Right Ear: External ear normal.      Left Ear: External ear normal.      Nose: Nose normal.      Mouth/Throat:      Mouth: Mucous membranes are moist.      Pharynx: Oropharynx is clear.   Eyes:      Conjunctiva/sclera: Conjunctivae normal.      Pupils: Pupils are equal, round, and reactive to light.   Cardiovascular:      Rate and Rhythm: Tachycardia present.      Pulses: Normal pulses.      Heart sounds: Normal heart sounds.   Pulmonary:      Effort: Pulmonary effort is normal.      Breath sounds: Wheezing present.      Comments: diminished  Abdominal:      General: Bowel sounds are normal.      Palpations: Abdomen is soft.   Musculoskeletal:         General: Normal range of motion.      Cervical back: Normal range of motion and neck supple.   Skin:     General: Skin is warm and dry.   Neurological:      General: No focal deficit present.      Mental Status: He is alert and oriented to person, place, and time.   Psychiatric:         Mood and Affect: Mood normal.         Behavior: Behavior normal.          Last Recorded Vitals  Blood pressure 98/81, pulse 89, temperature 36.3 °C (97.3 °F), temperature source Temporal, resp. rate 19, height 1.829 m (6'), weight 79.6 kg (175 lb 6.4 oz), SpO2 90%.    Relevant Results    Scheduled medications  amLODIPine, 5 mg, oral, Daily  aspirin, 81 mg, oral, Daily  enoxaparin, 40 mg, subcutaneous, q24h  ipratropium-albuteroL, 3 mL, nebulization, TID  levothyroxine, 125 mcg, oral, Daily  [START ON 3/9/2025] methylPREDNISolone sodium succinate (PF), 40 mg, intravenous, q8h  oxygen, , inhalation, Continuous -  Inhalation  [START ON 3/9/2025] pantoprazole, 40 mg, oral, Daily before breakfast  psyllium, 1 packet, oral, Daily      Continuous medications     PRN medications  PRN medications: acetaminophen, acetaminophen, benzocaine-menthol, calcium carbonate, dextromethorphan-guaifenesin, guaiFENesin, melatonin, ondansetron **OR** ondansetron, sennosides-docusate sodium    Results for orders placed or performed during the hospital encounter of 03/08/25 (from the past 24 hours)   CBC and Auto Differential   Result Value Ref Range    WBC 7.2 4.4 - 11.3 x10*3/uL    nRBC 0.0 0.0 - 0.0 /100 WBCs    RBC 4.84 4.50 - 5.90 x10*6/uL    Hemoglobin 14.5 13.5 - 17.5 g/dL    Hematocrit 42.8 41.0 - 52.0 %    MCV 88 80 - 100 fL    MCH 30.0 26.0 - 34.0 pg    MCHC 33.9 32.0 - 36.0 g/dL    RDW 13.4 11.5 - 14.5 %    Platelets 183 150 - 450 x10*3/uL    Neutrophils % 73.9 40.0 - 80.0 %    Immature Granulocytes %, Automated 0.1 0.0 - 0.9 %    Lymphocytes % 15.4 13.0 - 44.0 %    Monocytes % 9.2 2.0 - 10.0 %    Eosinophils % 1.0 0.0 - 6.0 %    Basophils % 0.4 0.0 - 2.0 %    Neutrophils Absolute 5.33 1.20 - 7.70 x10*3/uL    Immature Granulocytes Absolute, Automated 0.01 0.00 - 0.70 x10*3/uL    Lymphocytes Absolute 1.11 (L) 1.20 - 4.80 x10*3/uL    Monocytes Absolute 0.66 0.10 - 1.00 x10*3/uL    Eosinophils Absolute 0.07 0.00 - 0.70 x10*3/uL    Basophils Absolute 0.03 0.00 - 0.10 x10*3/uL   Comprehensive Metabolic Panel   Result Value Ref Range    Glucose 113 (H) 74 - 99 mg/dL    Sodium 137 136 - 145 mmol/L    Potassium 4.4 3.5 - 5.3 mmol/L    Chloride 103 98 - 107 mmol/L    Bicarbonate 24 21 - 32 mmol/L    Anion Gap 14 10 - 20 mmol/L    Urea Nitrogen 13 6 - 23 mg/dL    Creatinine 0.81 0.50 - 1.30 mg/dL    eGFR >90 >60 mL/min/1.73m*2    Calcium 9.7 8.6 - 10.3 mg/dL    Albumin 4.5 3.4 - 5.0 g/dL    Alkaline Phosphatase 65 33 - 120 U/L    Total Protein 7.7 6.4 - 8.2 g/dL    AST 33 9 - 39 U/L    Bilirubin, Total 0.4 0.0 - 1.2 mg/dL    ALT 49 10 - 52 U/L    Troponin I, High Sensitivity   Result Value Ref Range    Troponin I, High Sensitivity 5 0 - 20 ng/L   Lactate   Result Value Ref Range    Lactate 1.4 0.4 - 2.0 mmol/L   Sars-CoV-2 and Influenza A/B PCR   Result Value Ref Range    Flu A Result Not Detected Not Detected    Flu B Result Not Detected Not Detected    Coronavirus 2019, PCR Not Detected Not Detected   D-dimer, quantitative   Result Value Ref Range    D-Dimer Non VTE, Quant (ng/mL FEU) <215 <=500 ng/mL FEU   B-type natriuretic peptide   Result Value Ref Range    BNP 11 0 - 99 pg/mL             Assessment/Plan   Assessment & Plan  Acute respiratory failure with hypoxia (Multi)    Upper respiratory tract infection, unspecified type    E-cigarette or vaping product use associated lung injury (EVALI)    Diverticulosis    Chronic GERD    Benign essential HTN    ASHD (arteriosclerotic heart disease)    Acquired hypothyroidism      URI  Acute respiratory failure with hypoxia  Concern for EVALI vs. COPD exacerbation  Tobacco use  - SpO2 90% on RA  - supplemental oxygen to keep SpO2 > 90%  - currently on 2lpm via [place]  - No oxygen at home  - smokes 3-4 cigarettes a day and take 6 hits off a vape/day  - smoking cessation education offerered  - nicotine patch offered; patient refused  - CXR: No acute cardiopulmonary disease.   - CT chest: pending  - started solumedrol 40 mg q8h  - started duoneb q6h  - RT for bronchial hygiene  - started Breo 200-25 mcg daily, tiotropium 2.5 mcg daily  - procalcitonin pending  - Blood cultures pending    Essential HTN  ASHD  - continue aspirin 81 mg daily, amlodipine 5 mg daily  - monitor BP    Hypothyroidism  - continue levothyroxine 125 mcg daily    Diverticulosis  - continue metamucil 1 packet daily    Chronic GERD  - continue pantoprazole 40 mg daily    DVT ppx  - started enoxaparin 40 mg daily    Code status: Full    Disposition Patient requires more than 2 inpatient days.      Mojgan Vernon, APRN-CNP

## 2025-03-09 VITALS
WEIGHT: 175.4 LBS | SYSTOLIC BLOOD PRESSURE: 121 MMHG | HEIGHT: 72 IN | BODY MASS INDEX: 23.76 KG/M2 | DIASTOLIC BLOOD PRESSURE: 68 MMHG | RESPIRATION RATE: 16 BRPM | OXYGEN SATURATION: 91 % | TEMPERATURE: 97.9 F | HEART RATE: 98 BPM

## 2025-03-09 PROBLEM — J44.1 COPD EXACERBATION (MULTI): Status: ACTIVE | Noted: 2025-03-09

## 2025-03-09 LAB
ANION GAP SERPL CALC-SCNC: 13 MMOL/L (ref 10–20)
BACTERIA BLD CULT: NORMAL
BACTERIA BLD CULT: NORMAL
BUN SERPL-MCNC: 20 MG/DL (ref 6–23)
CALCIUM SERPL-MCNC: 9.6 MG/DL (ref 8.6–10.3)
CHLORIDE SERPL-SCNC: 104 MMOL/L (ref 98–107)
CO2 SERPL-SCNC: 24 MMOL/L (ref 21–32)
CREAT SERPL-MCNC: 0.81 MG/DL (ref 0.5–1.3)
EGFRCR SERPLBLD CKD-EPI 2021: >90 ML/MIN/1.73M*2
ERYTHROCYTE [DISTWIDTH] IN BLOOD BY AUTOMATED COUNT: 13.7 % (ref 11.5–14.5)
GLUCOSE SERPL-MCNC: 168 MG/DL (ref 74–99)
HCT VFR BLD AUTO: 42 % (ref 41–52)
HGB BLD-MCNC: 14.3 G/DL (ref 13.5–17.5)
MCH RBC QN AUTO: 30.3 PG (ref 26–34)
MCHC RBC AUTO-ENTMCNC: 34 G/DL (ref 32–36)
MCV RBC AUTO: 89 FL (ref 80–100)
NRBC BLD-RTO: 0 /100 WBCS (ref 0–0)
PLATELET # BLD AUTO: 204 X10*3/UL (ref 150–450)
POTASSIUM SERPL-SCNC: 4.3 MMOL/L (ref 3.5–5.3)
PROCALCITONIN SERPL-MCNC: 0.04 NG/ML
RBC # BLD AUTO: 4.72 X10*6/UL (ref 4.5–5.9)
SODIUM SERPL-SCNC: 137 MMOL/L (ref 136–145)
WBC # BLD AUTO: 7.2 X10*3/UL (ref 4.4–11.3)

## 2025-03-09 PROCEDURE — 2500000004 HC RX 250 GENERAL PHARMACY W/ HCPCS (ALT 636 FOR OP/ED): Mod: JZ,IPSPLIT | Performed by: NURSE PRACTITIONER

## 2025-03-09 PROCEDURE — 2500000005 HC RX 250 GENERAL PHARMACY W/O HCPCS: Performed by: NURSE PRACTITIONER

## 2025-03-09 PROCEDURE — 80048 BASIC METABOLIC PNL TOTAL CA: CPT | Performed by: NURSE PRACTITIONER

## 2025-03-09 PROCEDURE — 1100000001 HC PRIVATE ROOM DAILY: Mod: IPSPLIT

## 2025-03-09 PROCEDURE — 36415 COLL VENOUS BLD VENIPUNCTURE: CPT | Performed by: NURSE PRACTITIONER

## 2025-03-09 PROCEDURE — 94760 N-INVAS EAR/PLS OXIMETRY 1: CPT | Mod: IPSPLIT

## 2025-03-09 PROCEDURE — 94640 AIRWAY INHALATION TREATMENT: CPT

## 2025-03-09 PROCEDURE — 2500000001 HC RX 250 WO HCPCS SELF ADMINISTERED DRUGS (ALT 637 FOR MEDICARE OP): Performed by: NURSE PRACTITIONER

## 2025-03-09 PROCEDURE — 94640 AIRWAY INHALATION TREATMENT: CPT | Mod: IPSPLIT

## 2025-03-09 PROCEDURE — 2500000002 HC RX 250 W HCPCS SELF ADMINISTERED DRUGS (ALT 637 FOR MEDICARE OP, ALT 636 FOR OP/ED): Performed by: NURSE PRACTITIONER

## 2025-03-09 PROCEDURE — 85027 COMPLETE CBC AUTOMATED: CPT | Performed by: NURSE PRACTITIONER

## 2025-03-09 PROCEDURE — 2500000002 HC RX 250 W HCPCS SELF ADMINISTERED DRUGS (ALT 637 FOR MEDICARE OP, ALT 636 FOR OP/ED): Mod: IPSPLIT | Performed by: NURSE PRACTITIONER

## 2025-03-09 PROCEDURE — 94668 MNPJ CHEST WALL SBSQ: CPT | Mod: IPSPLIT

## 2025-03-09 PROCEDURE — 84145 PROCALCITONIN (PCT): CPT | Mod: GENLAB | Performed by: NURSE PRACTITIONER

## 2025-03-09 PROCEDURE — 94760 N-INVAS EAR/PLS OXIMETRY 1: CPT

## 2025-03-09 RX ADMIN — IPRATROPIUM BROMIDE AND ALBUTEROL SULFATE 3 ML: .5; 3 SOLUTION RESPIRATORY (INHALATION) at 16:15

## 2025-03-09 RX ADMIN — PANTOPRAZOLE SODIUM 40 MG: 40 TABLET, DELAYED RELEASE ORAL at 06:01

## 2025-03-09 RX ADMIN — ENOXAPARIN SODIUM 40 MG: 40 INJECTION SUBCUTANEOUS at 15:38

## 2025-03-09 RX ADMIN — LEVOTHYROXINE SODIUM 125 MCG: 125 TABLET ORAL at 08:46

## 2025-03-09 RX ADMIN — Medication 2 L/MIN: at 10:23

## 2025-03-09 RX ADMIN — AMLODIPINE BESYLATE 5 MG: 5 TABLET ORAL at 08:46

## 2025-03-09 RX ADMIN — TIOTROPIUM BROMIDE INHALATION SPRAY 2 PUFF: 3.12 SPRAY, METERED RESPIRATORY (INHALATION) at 12:58

## 2025-03-09 RX ADMIN — FLUTICASONE FUROATE AND VILANTEROL TRIFENATATE 1 PUFF: 200; 25 POWDER RESPIRATORY (INHALATION) at 12:58

## 2025-03-09 RX ADMIN — ASPIRIN 81 MG: 81 TABLET, COATED ORAL at 08:46

## 2025-03-09 RX ADMIN — IPRATROPIUM BROMIDE AND ALBUTEROL SULFATE 3 ML: .5; 3 SOLUTION RESPIRATORY (INHALATION) at 22:00

## 2025-03-09 RX ADMIN — METHYLPREDNISOLONE SODIUM SUCCINATE 40 MG: 40 INJECTION, POWDER, FOR SOLUTION INTRAMUSCULAR; INTRAVENOUS at 08:39

## 2025-03-09 RX ADMIN — IPRATROPIUM BROMIDE AND ALBUTEROL SULFATE 3 ML: .5; 3 SOLUTION RESPIRATORY (INHALATION) at 10:20

## 2025-03-09 RX ADMIN — METHYLPREDNISOLONE SODIUM SUCCINATE 40 MG: 40 INJECTION, POWDER, FOR SOLUTION INTRAMUSCULAR; INTRAVENOUS at 15:38

## 2025-03-09 RX ADMIN — Medication 2 L/MIN: at 16:21

## 2025-03-09 RX ADMIN — PSYLLIUM HUSK 1 PACKET: 3.4 POWDER ORAL at 08:46

## 2025-03-09 ASSESSMENT — COGNITIVE AND FUNCTIONAL STATUS - GENERAL
MOBILITY SCORE: 24
DAILY ACTIVITIY SCORE: 24

## 2025-03-09 ASSESSMENT — PAIN - FUNCTIONAL ASSESSMENT: PAIN_FUNCTIONAL_ASSESSMENT: 0-10

## 2025-03-09 ASSESSMENT — PAIN SCALES - GENERAL
PAINLEVEL_OUTOF10: 0 - NO PAIN
PAINLEVEL_OUTOF10: 0 - NO PAIN

## 2025-03-09 NOTE — CARE PLAN
The clinical goals for the shift include Patient will maintain SPO2 at or above 92% this shift.    Over the shift, the patient maintained adequate SPO2 and all other vitals remained stable. Patient did experience some labored breathing this shift, especially in the morning which he said is becoming normal for him. No reports of pain. Appetite was good, patient has been ambulating to bathroom and in room independently. Wife at bedside this afternoon. Currently sitting at edge of bed with call light in reach. Denies any needs at this time.

## 2025-03-09 NOTE — PROGRESS NOTES
Sanford Jones is a 49 y.o. male on day 0 of admission presenting with Acute respiratory failure with hypoxia (Multi).      Subjective   Patient assessed at bedside; sitting up in bed. He has conversational dyspnea. He has a cough; he is coughing some stuff up. We discussed his CT results. He was advised he does have COPD and he needs to stop smoking and vaping.       Objective     Last Recorded Vitals  /79 (BP Location: Left arm, Patient Position: Lying)   Pulse 96   Temp 36.3 °C (97.3 °F) (Temporal)   Resp 16   Wt 79.6 kg (175 lb 6.4 oz)   SpO2 95%   Intake/Output last 3 Shifts:    Intake/Output Summary (Last 24 hours) at 3/9/2025 1210  Last data filed at 3/8/2025 1800  Gross per 24 hour   Intake 720 ml   Output --   Net 720 ml       Admission Weight  Weight: 83.9 kg (185 lb) (03/08/25 0856)    Daily Weight  03/08/25 : 79.6 kg (175 lb 6.4 oz)    Image Results      Physical Exam    Vitals reviewed.   Constitutional:       Appearance: Normal appearance. He is normal weight.   HENT:      Head: Normocephalic and atraumatic.      Right Ear: External ear normal.      Left Ear: External ear normal.      Nose: Nose normal.      Mouth/Throat:      Mouth: Mucous membranes are moist.      Pharynx: Oropharynx is clear.   Eyes:      Conjunctiva/sclera: Conjunctivae normal.      Pupils: Pupils are equal, round, and reactive to light.   Cardiovascular:      Rate and Rhythm: Tachycardia present.      Pulses: Normal pulses.      Heart sounds: Normal heart sounds.   Pulmonary:      Effort: Pulmonary effort is normal.      Breath sounds: Wheezing present.      Comments: diminished  Abdominal:      General: Bowel sounds are normal.      Palpations: Abdomen is soft.   Musculoskeletal:         General: Normal range of motion.      Cervical back: Normal range of motion and neck supple.   Skin:     General: Skin is warm and dry.   Neurological:      General: No focal deficit present.      Mental Status: He is alert and  oriented to person, place, and time.   Psychiatric:         Mood and Affect: Mood normal.         Behavior: Behavior normal.       Relevant Results    Scheduled medications  amLODIPine, 5 mg, oral, Daily  aspirin, 81 mg, oral, Daily  enoxaparin, 40 mg, subcutaneous, q24h  influenza, 0.5 mL, intramuscular, During hospitalization  fluticasone furoate-vilanteroL, 1 puff, inhalation, Daily  ipratropium-albuteroL, 3 mL, nebulization, TID  levothyroxine, 125 mcg, oral, Daily  methylPREDNISolone sodium succinate (PF), 40 mg, intravenous, q8h  oxygen, , inhalation, Continuous - Inhalation  pantoprazole, 40 mg, oral, Daily before breakfast  psyllium, 1 packet, oral, Daily  tiotropium, 2 puff, inhalation, Daily      Continuous medications     PRN medications  PRN medications: acetaminophen, acetaminophen, benzocaine-menthol, calcium carbonate, dextromethorphan-guaifenesin, guaiFENesin, melatonin, [DISCONTINUED] ondansetron **OR** ondansetron, sennosides-docusate sodium    Results for orders placed or performed during the hospital encounter of 03/08/25 (from the past 24 hours)   Basic metabolic panel   Result Value Ref Range    Glucose 168 (H) 74 - 99 mg/dL    Sodium 137 136 - 145 mmol/L    Potassium 4.3 3.5 - 5.3 mmol/L    Chloride 104 98 - 107 mmol/L    Bicarbonate 24 21 - 32 mmol/L    Anion Gap 13 10 - 20 mmol/L    Urea Nitrogen 20 6 - 23 mg/dL    Creatinine 0.81 0.50 - 1.30 mg/dL    eGFR >90 >60 mL/min/1.73m*2    Calcium 9.6 8.6 - 10.3 mg/dL   CBC   Result Value Ref Range    WBC 7.2 4.4 - 11.3 x10*3/uL    nRBC 0.0 0.0 - 0.0 /100 WBCs    RBC 4.72 4.50 - 5.90 x10*6/uL    Hemoglobin 14.3 13.5 - 17.5 g/dL    Hematocrit 42.0 41.0 - 52.0 %    MCV 89 80 - 100 fL    MCH 30.3 26.0 - 34.0 pg    MCHC 34.0 32.0 - 36.0 g/dL    RDW 13.7 11.5 - 14.5 %    Platelets 204 150 - 450 x10*3/uL                   Assessment/Plan      Assessment & Plan  Acute respiratory failure with hypoxia (Multi)    Upper respiratory tract infection,  unspecified type    E-cigarette or vaping product use associated lung injury (EVALI)    Diverticulosis    Chronic GERD    Benign essential HTN    ASHD (arteriosclerotic heart disease)    Acquired hypothyroidism    COPD exacerbation (Multi)    URI  Acute respiratory failure with hypoxia  Concern for EVALI   COPD exacerbation  Tobacco use  - SpO2 90% on RA  - supplemental oxygen to keep SpO2 > 90%  - currently on 2lpm via NC  - No oxygen at home  - smokes 3-4 cigarettes a day and take 6 hits off a vape/day  - smoking cessation education offerered  - nicotine patch offered; patient refused  - CXR: No acute cardiopulmonary disease.   - CT chest: No acute intrathoracic findings on unenhanced CT. Mild emphysema.  - continue solumedrol 40 mg q8h  - continue duoneb q6h  - RT for bronchial hygiene  - continue Breo 200-25 mcg daily, tiotropium 2.5 mcg daily  - procalcitonin 0.04  - Blood cultures negative     Essential HTN  ASHD  - continue aspirin 81 mg daily, amlodipine 5 mg daily  - monitor BP     Hypothyroidism  - continue levothyroxine 125 mcg daily     Diverticulosis  - continue metamucil 1 packet daily     Chronic GERD  - continue pantoprazole 40 mg daily     DVT ppx  - started enoxaparin 40 mg daily     Code status: Full     Disposition Patient requires more than 2 inpatient days.              ABILIO Lopez-CNP

## 2025-03-09 NOTE — CONSULTS
Reason For Consult  COPD Navigator    This RT to see patient for COPD consult. The patient was given a COPD booklet with educational materials regarding pulmonary issues. Smoking cessation education reviewed, documentation given. Smoking history patient smokes, vapes and uses nicotine pouches. I discussed various options for quitting smoking.  [facility] support group discussed. Flyer given for next month's meeting. Patient diagnosed with COPD this hospital stay. I educated patient about the disease process and how it affected the lungs making it difficult to breathe. We discussed current medications during this hospital stay. Patient should be discharged with the inhalers being used while here. Patient will be referred to Dr. Winter, his card was given to patient. Primary Dr Shantel Andre, will see her on the 18th of this month. No cpap, bipap or Oxygen at home. Patient will be tested for Oxygen needs before discharge. Patient given  pulmonary office phone number to make an appt. Patient was very receptive to all information given.      Spacer- The patient was given an aerochamber (spacer) to be administered with a meter dose inhaler at home. I instructed the patient on how to use the spacer with the proper technique to get the best results.  I also discussed the names, reasons, and side effects of respiratory medications that are to be prescribed at home, including does and frequency.   Flutter valve- The patient was given a flutter valve to help facilitate the removal of mucous from the airways. I instructed the patient on how to use the flutter with the proper technique to get the best results. In return, the patient demonstrated flutter training appropriately with a good understanding on how it is utilized.   Pulmonary rehab- Was not discussed this visit. As patient was just diagnosed this hospital stay.      Susanna Solo, RRT

## 2025-03-09 NOTE — CARE PLAN
The patient's goals for the shift include resting    The clinical goals for the shift include Patient's SPO2 will remain >92% this shift    Assumed care of patient at 1930. Patient's SPO2 has remained 91-95% this shift. Denies pain. Patient resting in bed with call light within reach.

## 2025-03-10 ENCOUNTER — PHARMACY VISIT (OUTPATIENT)
Dept: PHARMACY | Facility: CLINIC | Age: 50
End: 2025-03-10
Payer: COMMERCIAL

## 2025-03-10 ENCOUNTER — HOSPITAL ENCOUNTER (OUTPATIENT)
Dept: CARDIOLOGY | Facility: HOSPITAL | Age: 50
Discharge: HOME | DRG: 190 | End: 2025-03-10
Payer: COMMERCIAL

## 2025-03-10 VITALS
TEMPERATURE: 97.7 F | HEIGHT: 72 IN | DIASTOLIC BLOOD PRESSURE: 92 MMHG | RESPIRATION RATE: 22 BRPM | HEART RATE: 107 BPM | BODY MASS INDEX: 23.76 KG/M2 | SYSTOLIC BLOOD PRESSURE: 123 MMHG | OXYGEN SATURATION: 90 % | WEIGHT: 175.4 LBS

## 2025-03-10 LAB
ANION GAP SERPL CALC-SCNC: 12 MMOL/L (ref 10–20)
ATRIAL RATE: 98 BPM
BUN SERPL-MCNC: 22 MG/DL (ref 6–23)
CALCIUM SERPL-MCNC: 9.1 MG/DL (ref 8.6–10.3)
CHLORIDE SERPL-SCNC: 105 MMOL/L (ref 98–107)
CO2 SERPL-SCNC: 24 MMOL/L (ref 21–32)
CREAT SERPL-MCNC: 0.81 MG/DL (ref 0.5–1.3)
EGFRCR SERPLBLD CKD-EPI 2021: >90 ML/MIN/1.73M*2
ERYTHROCYTE [DISTWIDTH] IN BLOOD BY AUTOMATED COUNT: 14 % (ref 11.5–14.5)
GLUCOSE SERPL-MCNC: 164 MG/DL (ref 74–99)
HCT VFR BLD AUTO: 40.3 % (ref 41–52)
HGB BLD-MCNC: 13.7 G/DL (ref 13.5–17.5)
MCH RBC QN AUTO: 30.2 PG (ref 26–34)
MCHC RBC AUTO-ENTMCNC: 34 G/DL (ref 32–36)
MCV RBC AUTO: 89 FL (ref 80–100)
NRBC BLD-RTO: 0 /100 WBCS (ref 0–0)
P AXIS: 82 DEGREES
P OFFSET: 198 MS
P ONSET: 148 MS
PLATELET # BLD AUTO: 223 X10*3/UL (ref 150–450)
POTASSIUM SERPL-SCNC: 4.3 MMOL/L (ref 3.5–5.3)
PR INTERVAL: 158 MS
Q ONSET: 227 MS
QRS COUNT: 16 BEATS
QRS DURATION: 80 MS
QT INTERVAL: 346 MS
QTC CALCULATION(BAZETT): 441 MS
QTC FREDERICIA: 407 MS
R AXIS: 66 DEGREES
RBC # BLD AUTO: 4.53 X10*6/UL (ref 4.5–5.9)
SODIUM SERPL-SCNC: 137 MMOL/L (ref 136–145)
T AXIS: 69 DEGREES
T OFFSET: 400 MS
VENTRICULAR RATE: 98 BPM
WBC # BLD AUTO: 11 X10*3/UL (ref 4.4–11.3)

## 2025-03-10 PROCEDURE — 85027 COMPLETE CBC AUTOMATED: CPT | Mod: IPSPLIT | Performed by: NURSE PRACTITIONER

## 2025-03-10 PROCEDURE — RXMED WILLOW AMBULATORY MEDICATION CHARGE

## 2025-03-10 PROCEDURE — 94640 AIRWAY INHALATION TREATMENT: CPT | Mod: IPSPLIT

## 2025-03-10 PROCEDURE — 94760 N-INVAS EAR/PLS OXIMETRY 1: CPT | Mod: IPSPLIT

## 2025-03-10 PROCEDURE — 82374 ASSAY BLOOD CARBON DIOXIDE: CPT | Mod: IPSPLIT | Performed by: NURSE PRACTITIONER

## 2025-03-10 PROCEDURE — 2500000002 HC RX 250 W HCPCS SELF ADMINISTERED DRUGS (ALT 637 FOR MEDICARE OP, ALT 636 FOR OP/ED): Mod: IPSPLIT | Performed by: NURSE PRACTITIONER

## 2025-03-10 PROCEDURE — 2500000001 HC RX 250 WO HCPCS SELF ADMINISTERED DRUGS (ALT 637 FOR MEDICARE OP): Mod: IPSPLIT | Performed by: NURSE PRACTITIONER

## 2025-03-10 PROCEDURE — 36415 COLL VENOUS BLD VENIPUNCTURE: CPT | Mod: IPSPLIT | Performed by: NURSE PRACTITIONER

## 2025-03-10 PROCEDURE — 93005 ELECTROCARDIOGRAM TRACING: CPT | Mod: IPSPLIT

## 2025-03-10 PROCEDURE — 2500000004 HC RX 250 GENERAL PHARMACY W/ HCPCS (ALT 636 FOR OP/ED): Mod: JZ,IPSPLIT | Performed by: NURSE PRACTITIONER

## 2025-03-10 RX ORDER — BUDESONIDE, GLYCOPYRROLATE, AND FORMOTEROL FUMARATE 160; 9; 4.8 UG/1; UG/1; UG/1
2 AEROSOL, METERED RESPIRATORY (INHALATION) 2 TIMES DAILY
Qty: 10.7 G | Refills: 1 | Status: SHIPPED | OUTPATIENT
Start: 2025-03-10

## 2025-03-10 RX ORDER — METHYLPREDNISOLONE 4 MG/1
TABLET ORAL
Qty: 21 TABLET | Refills: 0 | Status: SHIPPED | OUTPATIENT
Start: 2025-03-10 | End: 2025-03-16

## 2025-03-10 RX ORDER — ALBUTEROL SULFATE 90 UG/1
INHALANT RESPIRATORY (INHALATION)
Qty: 6.7 G | Refills: 0 | Status: SHIPPED | OUTPATIENT
Start: 2025-03-10

## 2025-03-10 RX ADMIN — AMLODIPINE BESYLATE 5 MG: 5 TABLET ORAL at 09:30

## 2025-03-10 RX ADMIN — LEVOTHYROXINE SODIUM 125 MCG: 125 TABLET ORAL at 09:30

## 2025-03-10 RX ADMIN — FLUTICASONE FUROATE AND VILANTEROL TRIFENATATE 1 PUFF: 200; 25 POWDER RESPIRATORY (INHALATION) at 10:02

## 2025-03-10 RX ADMIN — METHYLPREDNISOLONE SODIUM SUCCINATE 40 MG: 40 INJECTION, POWDER, FOR SOLUTION INTRAMUSCULAR; INTRAVENOUS at 09:30

## 2025-03-10 RX ADMIN — TIOTROPIUM BROMIDE INHALATION SPRAY 2 PUFF: 3.12 SPRAY, METERED RESPIRATORY (INHALATION) at 10:02

## 2025-03-10 RX ADMIN — PANTOPRAZOLE SODIUM 40 MG: 40 TABLET, DELAYED RELEASE ORAL at 06:02

## 2025-03-10 RX ADMIN — METHYLPREDNISOLONE SODIUM SUCCINATE 40 MG: 40 INJECTION, POWDER, FOR SOLUTION INTRAMUSCULAR; INTRAVENOUS at 00:14

## 2025-03-10 RX ADMIN — ASPIRIN 81 MG: 81 TABLET, COATED ORAL at 09:30

## 2025-03-10 RX ADMIN — IPRATROPIUM BROMIDE AND ALBUTEROL SULFATE 3 ML: .5; 3 SOLUTION RESPIRATORY (INHALATION) at 10:02

## 2025-03-10 ASSESSMENT — PAIN SCALES - GENERAL
PAINLEVEL_OUTOF10: 0 - NO PAIN
PAINLEVEL_OUTOF10: 0 - NO PAIN

## 2025-03-10 ASSESSMENT — ACTIVITIES OF DAILY LIVING (ADL): LACK_OF_TRANSPORTATION: NO

## 2025-03-10 ASSESSMENT — PAIN - FUNCTIONAL ASSESSMENT
PAIN_FUNCTIONAL_ASSESSMENT: 0-10
PAIN_FUNCTIONAL_ASSESSMENT: 0-10

## 2025-03-10 NOTE — PROCEDURES
Pt completed an overnight pulse ox trend on room air, on the night of 3/9/25.  Report start date: 3/9/25 10:00:00 PM.  Report end date: 3/10/25 6:00:00 AM.  Total time below spo2 88%: 6 min 0 sec.  Longest duration below: 50 sec.  Lowest spo2: 85 at 03/10/25 03:48:40 AM.  Number of events: 33.

## 2025-03-10 NOTE — PROGRESS NOTES
03/10/25 1015   Discharge Planning   Living Arrangements Spouse/significant other   Support Systems Spouse/significant other   Assistance Needed Patient is A&Ox3. Independent with ADL's and iADL's, denies the use of assistive devices. States he does work and is requesting a return to work slip. States he does not wear oxygen normally.   Type of Residence Private residence   Do you have animals or pets at home? No   Who is requesting discharge planning? Provider   Home or Post Acute Services In home services   Type of Home Care Services DME or oxygen   Expected Discharge Disposition Home   Does the patient need discharge transport arranged? No  (Wife to )   Financial Resource Strain   How hard is it for you to pay for the very basics like food, housing, medical care, and heating? Not hard   Housing Stability   In the last 12 months, was there a time when you were not able to pay the mortgage or rent on time? N   In the past 12 months, how many times have you moved where you were living? 0   At any time in the past 12 months, were you homeless or living in a shelter (including now)? N   Transportation Needs   In the past 12 months, has lack of transportation kept you from medical appointments or from getting medications? no   In the past 12 months, has lack of transportation kept you from meetings, work, or from getting things needed for daily living? No   Stroke Family Assessment   Stroke Family Assessment Needed No   Intensity of Service   Intensity of Service 0-30 min

## 2025-03-10 NOTE — DISCHARGE INSTR - OTHER ORDERS
Thank you for choosing [facility] for your Health Care needs.  Also, thank you for allowing us to take you and your families preferences into account when determining your discharge plan.  Stay well!                                    Your Care Transitions Team Member:  Palma Faust Robin and Vilma     For questions about your medications listed on your discharge instructions, please call the Nurses Station at [phone].

## 2025-03-10 NOTE — DISCHARGE SUMMARY
Discharge Diagnosis  Acute respiratory failure with hypoxia (Multi)  COPD exacerbation  Tobacco use  Vape use    Issues Requiring Follow-Up      Discharge Meds     Medication List      START taking these medications     Joe Aerosphere 160-9-4.8 mcg/actuation HFA aerosol inhaler; Generic   drug: budesonide-glycopyr-formoterol; Inhale 2 puffs 2 times a day. Rinse   mouth out after each use   methylPREDNISolone 4 mg tablets; Commonly known as: Medrol Dospak; Take   as directed on package. Take with food     CHANGE how you take these medications     * albuterol 2.5 mg /3 mL (0.083 %) nebulizer solution; Take 3 mL (2.5   mg) by nebulization every 4 hours if needed for wheezing.; What changed:   Another medication with the same name was removed. Continue taking this   medication, and follow the directions you see here.   * albuterol 90 mcg/actuation inhaler; Commonly known as: Proventil HFA;   Take 1 puff via spacer,  take 5-6 easy breaths.  Rest 2 minutes.  Repeat   x1.  May use every 4-6 hours as needed; What changed: Another medication   with the same name was removed. Continue taking this medication, and   follow the directions you see here.  * This list has 2 medication(s) that are the same as other medications   prescribed for you. Read the directions carefully, and ask your doctor or   other care provider to review them with you.     CONTINUE taking these medications     Aerochamber MV inhaler; Generic drug: inhalational spacing device; Use   as instructed   amLODIPine 5 mg tablet; Commonly known as: Norvasc; TAKE 1 TABLET BY   MOUTH EVERY DAY   aspirin 81 mg EC tablet   Bifidobacterium infantis 4 mg capsule; Commonly known as: ALIGN; Take 1   capsule (4 mg) by mouth once daily.   Essential Man 50 Plus 0.4-2-250 mg-mg-mcg tablet; Generic drug:   multivitamin-minerals-folic acid-lycopen-lutein   hyoscyamine 0.125 mg disintegrating tablet; Commonly known as: Anaspaz;   Place 1 tablet (0.125 mg) under the tongue  every 4 hours if needed   (abdominal pain, bloating, and/or diarrhea).   levothyroxine 125 mcg tablet; Commonly known as: Synthroid, Levoxyl;   Take 1 tablet (125 mcg) by mouth once daily.   pantoprazole 40 mg EC tablet; Commonly known as: ProtoNix; Take 1 tablet   (40 mg) by mouth 2 times a day before meals.   psyllium 0.4 gram capsule; Commonly known as: MetamuciL; Take 3-5   capsules by mouth once daily.     STOP taking these medications     nicotine 21 mg/24 hr patch; Commonly known as: Nicoderm CQ       Test Results Pending At Discharge  Pending Labs       Order Current Status    Blood Culture Preliminary result    Blood Culture Preliminary result         49 y.o. male presenting with a complaint of SOB. He has had intermittent URI for the past 3 months. He has been on 2 separate courses of antibiotic and steroids. His most recent course was completed 3 weeks ago. Over the past 3 days he has had cough, SOB, fever and chills. He denies chest pain, N/V/D/C.     In the ED:  VS: T 37.1; ; R 21; /75; SpO2 92% on RA  Lab: Glu 113; Na 137; K 4.4; BuN 13; Cr 0.81; WBC 7.2; Hb 14.5 Hcrt 42.8;   Radiology: CXR No acute cardiopulmonary disease   Medication: None  Disposition: admitted to med/surg    Hospital Course       Acute respiratory failure with hypoxia  COPD exacerbation  Tobacco use  Vape use  - SpO2 90% on RA  - supplemental oxygen to keep SpO2 > 90%  - currently on 2lpm via NC  - No oxygen at home  - smokes 3-4 cigarettes a day and take 6 hits off a vape/day  - smoking cessation education offerered  - nicotine patch offered; patient refused  - CXR: No acute cardiopulmonary disease.   - CT chest: No acute intrathoracic findings on unenhanced CT. Mild emphysema.  - continue solumedrol 40 mg q8h  - continue duoneb q6h  - RT for bronchial hygiene  - continue Breo 200-25 mcg daily, tiotropium 2.5 mcg daily  - procalcitonin 0.04  - Blood cultures negative  - overnight pulse ox study: qualified for  2lpm via NC at night. He dropped below 88% for > then 6 mins  - outpatient pulmonology follow up  - outpatient PFT     Essential HTN  ASHD  - continue aspirin 81 mg daily, amlodipine 5 mg daily  - monitor BP     Hypothyroidism  - continue levothyroxine 125 mcg daily     Diverticulosis  - continue metamucil 1 packet daily     Chronic GERD  - continue pantoprazole 40 mg daily     DVT ppx  - started enoxaparin 40 mg daily     Code status: Full     Disposition: Patient was stable to be discharged to home. He was discharged on Breztri due to insurance, medrol dose pack, and oxygen 2lpm via NC at . He will follow up with pulmonology and his PCP.    Seen and discussed with Dr. Duane MD     Total cumulative time spent in preparation of this discharge including documentation review, coordination of care with the medical team including PT/SW/care coordinators and treating consultants, discussion with patient and pertinent family members and finalization of prescriptions, follow-up appointments, and this discharge summary was approximately 45 minutes.     Pertinent Physical Exam At Time of Discharge  Physical Exam  Vitals reviewed.   Constitutional:       Appearance: Normal appearance. He is normal weight.   HENT:      Head: Normocephalic and atraumatic.      Right Ear: External ear normal.      Left Ear: External ear normal.      Nose: Nose normal.      Mouth/Throat:      Mouth: Mucous membranes are moist.      Pharynx: Oropharynx is clear.   Eyes:      Conjunctiva/sclera: Conjunctivae normal.      Pupils: Pupils are equal, round, and reactive to light.   Cardiovascular:      Rate and Rhythm: Tachycardia present.      Pulses: Normal pulses.      Heart sounds: Normal heart sounds.   Pulmonary:      Effort: Pulmonary effort is normal.      Breath sounds: Wheezing present.      Comments: diminished  Abdominal:      General: Bowel sounds are normal.      Palpations: Abdomen is soft.   Musculoskeletal:         General:  Normal range of motion.      Cervical back: Normal range of motion and neck supple.   Skin:     General: Skin is warm and dry.   Neurological:      General: No focal deficit present.      Mental Status: He is alert and oriented to person, place, and time.   Psychiatric:         Mood and Affect: Mood normal.         Behavior: Behavior normal.   Outpatient Follow-Up  Future Appointments   Date Time Provider Department Center   3/18/2025 10:00 AM ABILIO Ramirez-CNP [id number]         ABILIO Lopez-CNP

## 2025-03-10 NOTE — CARE PLAN
The patient's goals for the shift include resting  Problem: Pain - Adult  Goal: Verbalizes/displays adequate comfort level or baseline comfort level  Outcome: Progressing     Problem: Safety - Adult  Goal: Free from fall injury  Outcome: Progressing     Problem: Discharge Planning  Goal: Discharge to home or other facility with appropriate resources  Outcome: Progressing     Problem: Chronic Conditions and Co-morbidities  Goal: Patient's chronic conditions and co-morbidity symptoms are monitored and maintained or improved  Outcome: Progressing     Problem: Nutrition  Goal: Nutrient intake appropriate for maintaining nutritional needs  Outcome: Progressing       The clinical goals for the shift include Patient's SPO2 will remain >90% this shift

## 2025-03-10 NOTE — CARE PLAN
The patient's goals for the shift include CT scan    The clinical goals for the shift include Puls eox will be >90% on room airtoday    Pt discharged to home with wife. He got meds from [facility] prior to DC. Has the phone number to set up oxygen at home for HS, has return to work note. Pt still has some exp wheezes. No C/O pain. Up ad kely. Aware to try and cover nose/mouth if out in the cold air. Given info on COPD.

## 2025-03-11 ENCOUNTER — PATIENT OUTREACH (OUTPATIENT)
Dept: PRIMARY CARE | Facility: CLINIC | Age: 50
End: 2025-03-11
Payer: COMMERCIAL

## 2025-03-11 ENCOUNTER — TELEPHONE (OUTPATIENT)
Dept: PRIMARY CARE | Facility: CLINIC | Age: 50
End: 2025-03-11
Payer: COMMERCIAL

## 2025-03-11 DIAGNOSIS — E03.9 ACQUIRED HYPOTHYROIDISM: ICD-10-CM

## 2025-03-11 DIAGNOSIS — J44.9 CHRONIC OBSTRUCTIVE PULMONARY DISEASE, UNSPECIFIED COPD TYPE (MULTI): Primary | ICD-10-CM

## 2025-03-11 RX ORDER — IPRATROPIUM BROMIDE AND ALBUTEROL SULFATE 2.5; .5 MG/3ML; MG/3ML
3 SOLUTION RESPIRATORY (INHALATION) 4 TIMES DAILY PRN
Qty: 90 ML | Refills: 0 | Status: SHIPPED | OUTPATIENT
Start: 2025-03-11 | End: 2026-03-11

## 2025-03-11 NOTE — PROGRESS NOTES
"Discharge Facility: [facility]   Discharge Diagnosis: Acute respiratory failure with hypoxia; Upper respiratory tract infection, unspecified type; Bronchitis   Admission Date: 3/8/2025   Discharge Date: 3/10/2025     PCP Appointment Date: 3/18/2025  Specialist Appointment Date: 4/10/2025  Hospital Encounter and Summary Linked: Yes  ED to Hosp-Admission (Discharged) with Vlad Zepeda MD; Sharon BRAMBILA MD (03/08/2025)   See discharge assessment below for further details  Wrap Up  Wrap Up Additional Comments: Successful outreach to the patient has been completed. The patient reports feeling well at home since discharge and denies experiencing any chest pain, shortness of breath, or other concerns. States he does still have cough but has been working on getting all the \"mucous\" out of his chest. We reviewed their new medications and any changes made. States he already contacted office to get refills on his albuterol nebulizer solution. The patient confirmed that they have all the necessary supplies and resources at home, and they also have support from family and friends if needed. States he has his home oxygen and using as directed. I emphasized the importance of follow-up appointments with both their primary care physician and specialists to assess treatment response. Patient states he is in need of a doctor's statement excusing him from Jury Duty as he will not be able to sit for that long given his dx of reynaud's and his O2 use. The patient is aware of my availability for non-emergency concerns and has been provided with my contact information. (3/11/2025 11:56 AM)    Engagement  Call Start Time: 1142 (3/11/2025 11:56 AM)    Medications  Medications reviewed with patient/caregiver?: Yes (new meds/changes discussed with patient) (3/11/2025 11:56 AM)  Is the patient having any side effects they believe may be caused by any medication additions or changes?: No (3/11/2025 11:56 AM)  Does the patient " have all medications ordered at discharge?: Yes (3/11/2025 11:56 AM)  Care Management Interventions: No intervention needed (3/11/2025 11:56 AM)  Prescription Comments: see med list (breztri; medrol dospak; albuteral nebulizer; albuteral inhaler) (3/11/2025 11:56 AM)  Is the patient taking all medications as directed (includes completed medication regime)?: Yes (3/11/2025 11:56 AM)  Care Management Interventions: Provided patient education (3/11/2025 11:56 AM)  Medication Comments: Patient states he already contacted office to get refills on his albuterol nebulizer. (3/11/2025 11:56 AM)    Appointments  Does the patient have a primary care provider?: Yes (3/11/2025 11:56 AM)  Care Management Interventions: Verified appointment date/time/provider (3/11/2025 11:56 AM)  Has the patient kept scheduled appointments due by today?: Yes (3/11/2025 11:56 AM)  Care Management Interventions: Advised patient to keep appointment (3/11/2025 11:56 AM)    Self Management  What is the home health agency?: denies need (3/11/2025 11:56 AM)  What Durable Medical Equipment (DME) was ordered?: Home oxygen 2LNC (3/11/2025 11:56 AM)  Has all Durable Medical Equipment (DME) been delivered?: Yes (3/11/2025 11:56 AM)    Patient Teaching  Does the patient have access to their discharge instructions?: Yes (3/11/2025 11:56 AM)  Care Management Interventions: Reviewed instructions with patient (3/11/2025 11:56 AM)  What is the patient's perception of their health status since discharge?: Improving (3/11/2025 11:56 AM)  Is the patient/caregiver able to teach back the hierarchy of who to call/visit for symptoms/problems? PCP, Specialist, Home Health nurse, Urgent Care, ED, 911: Yes (3/11/2025 11:56 AM)

## 2025-03-12 LAB
BACTERIA BLD CULT: NORMAL
BACTERIA BLD CULT: NORMAL

## 2025-03-12 NOTE — PROGRESS NOTES
"Subjective   Patient ID: Sanford Jones is a 49 y.o. male who presents for Hospital Follow-up (TCM; \"things have been okay\" since discharge; bring up yellowish green mucus while coughing; he stated they did not RX him antibiotics, just finished prednisone 2 days ago; sinus drainage on and off; asking for a jury duty excess;  ).    HPI     Sanford Jones is a 49 y.o. male presenting today for follow-up after being discharged from the hospital 9 days ago. The main problem requiring admission was acute respiratory failure with hypoxia, upper respiratory tract infection, unspecified type, and bonchitis . The discharge summary and/or TransitionalCare Management documentation was reviewed. Medication reconciliation was performed as indicated via the \"Sanford as Reviewed\" timestamp.    Sanford Jones was contacted by Transitional Care Management services two days after his discharge. This encounter and supporting documentation was reviewed.     Sanford is here for hospital follow up. He was admitted for respiratory failure.     Before going to the ED he had gone to the urgent care x2 in November and January- had antibiotics and steroids for episodes of sinusitis and URI's with wheezing. Hospital discharge they want him to start the Breztri. He has multiple asthma medicaitons listed but is not sure which ones he is taking. He is going to be following up with pulmonologist in a month. He is still coughing up green mucus, he denies any fevers. Has SOB with exertion. He has been taking mucinex and he quit smoking cigarettes.      Hypothyroidism: Increased levothyroxine to 125mcg last time, Doing well on higher dose. TSH- 4.67, T4 1.4. Going to increase to 137 today.        All other systems reviewed and negative for complaint unless stated above.    Objective   /79 (BP Location: Right arm, Patient Position: Sitting, BP Cuff Size: Large adult)   Pulse 77   Wt 83.7 kg (184 lb 9.6 oz)   BMI 25.04 kg/m² "       Physical Exam  Vitals reviewed.   Constitutional:       Appearance: He is normal weight.   HENT:      Right Ear: Tympanic membrane normal.      Left Ear: Tympanic membrane normal.      Mouth/Throat:      Mouth: Mucous membranes are moist.      Pharynx: Oropharynx is clear.   Cardiovascular:      Rate and Rhythm: Normal rate and regular rhythm.      Pulses: Normal pulses.      Heart sounds: Normal heart sounds.   Pulmonary:      Effort: Pulmonary effort is normal.      Breath sounds: Wheezing present.   Abdominal:      General: Bowel sounds are normal.   Musculoskeletal:      Cervical back: Normal range of motion.   Skin:     General: Skin is warm.      Capillary Refill: Capillary refill takes less than 2 seconds.   Neurological:      General: No focal deficit present.      Mental Status: He is alert and oriented to person, place, and time.   Psychiatric:         Mood and Affect: Mood normal.         Behavior: Behavior normal.         Assessment/Plan   Diagnoses and all orders for this visit:    Hospital discharge follow-up  COPD exacerbation (Multi)  Financial insecurity due to medical expenses  -     budesonide-glycopyr-formoterol (Breztri Aerosphere) 160-9-4.8 mcg/actuation HFA aerosol inhaler; Inhale 2 puffs 2 times a day. Rinse mouth out after each use  - May not be able to afford due to cost  -Has an ill child with multiple co morbidities at home.   -Following with Pulmonologist in April   Upper respiratory tract infection, unspecified type  -     azithromycin (Zithromax) 250 mg tablet; Take 2 tablets (500 mg) by mouth once daily for 1 day, THEN 1 tablet (250 mg) once daily for 4 days. Take 2 tabs (500 mg) by mouth today, than 1 daily for 4 days..    Follow up in ER if no improvement or symptoms get worse.

## 2025-03-18 ENCOUNTER — APPOINTMENT (OUTPATIENT)
Dept: PRIMARY CARE | Facility: CLINIC | Age: 50
End: 2025-03-18
Payer: COMMERCIAL

## 2025-03-18 VITALS
BODY MASS INDEX: 25.04 KG/M2 | DIASTOLIC BLOOD PRESSURE: 79 MMHG | SYSTOLIC BLOOD PRESSURE: 123 MMHG | HEART RATE: 77 BPM | WEIGHT: 184.6 LBS

## 2025-03-18 DIAGNOSIS — Z59.86 FINANCIAL INSECURITY DUE TO MEDICAL EXPENSES: Primary | ICD-10-CM

## 2025-03-18 DIAGNOSIS — Z09 HOSPITAL DISCHARGE FOLLOW-UP: ICD-10-CM

## 2025-03-18 DIAGNOSIS — J44.1 COPD EXACERBATION (MULTI): Primary | ICD-10-CM

## 2025-03-18 DIAGNOSIS — J06.9 UPPER RESPIRATORY TRACT INFECTION, UNSPECIFIED TYPE: ICD-10-CM

## 2025-03-18 DIAGNOSIS — J44.1 COPD EXACERBATION (MULTI): ICD-10-CM

## 2025-03-18 LAB
T4 FREE SERPL-MCNC: 1.4 NG/DL (ref 0.8–1.8)
TSH SERPL-ACNC: 4.67 MIU/L (ref 0.4–4.5)

## 2025-03-18 PROCEDURE — 3074F SYST BP LT 130 MM HG: CPT

## 2025-03-18 PROCEDURE — 3078F DIAST BP <80 MM HG: CPT

## 2025-03-18 PROCEDURE — 99495 TRANSJ CARE MGMT MOD F2F 14D: CPT

## 2025-03-18 RX ORDER — BUDESONIDE, GLYCOPYRROLATE, AND FORMOTEROL FUMARATE 160; 9; 4.8 UG/1; UG/1; UG/1
2 AEROSOL, METERED RESPIRATORY (INHALATION) 2 TIMES DAILY
Qty: 10.7 G | Refills: 1 | Status: SHIPPED | OUTPATIENT
Start: 2025-03-18

## 2025-03-18 RX ORDER — LEVOTHYROXINE SODIUM 137 UG/1
137 TABLET ORAL DAILY
Qty: 30 TABLET | Refills: 11 | Status: SHIPPED | OUTPATIENT
Start: 2025-03-18 | End: 2026-03-18

## 2025-03-18 RX ORDER — AZITHROMYCIN 250 MG/1
TABLET, FILM COATED ORAL
Qty: 6 TABLET | Refills: 0 | Status: SHIPPED | OUTPATIENT
Start: 2025-03-18 | End: 2025-03-23

## 2025-03-18 SDOH — ECONOMIC STABILITY - INCOME SECURITY: FINANCIAL INSECURITY: Z59.86

## 2025-03-18 NOTE — PROGRESS NOTES
I reviewed the progress note and agree with the resident’s findings and plans as written. Case discussed with resident.    Fide Terrazas, PharmD

## 2025-03-18 NOTE — LETTER
Sanford Youngogast  1975    3/18/2025    To Whom This May Concern:    My patient, Sanford Jones, is unable to perform Jury Duty due to a mental or physical condition that renders the prospective juror unfit for jury service for the remainder of the jury year.    Should you have any questions please do not hesitate to call.    Thank you for your cooperation.    Sincerely,    Yazmin Umaña, CNP

## 2025-03-21 ENCOUNTER — PATIENT OUTREACH (OUTPATIENT)
Dept: PRIMARY CARE | Facility: CLINIC | Age: 50
End: 2025-03-21
Payer: COMMERCIAL

## 2025-04-10 ENCOUNTER — APPOINTMENT (OUTPATIENT)
Dept: PULMONOLOGY | Facility: CLINIC | Age: 50
End: 2025-04-10
Payer: COMMERCIAL

## 2025-04-10 ENCOUNTER — TELEPHONE (OUTPATIENT)
Dept: PULMONOLOGY | Facility: CLINIC | Age: 50
End: 2025-04-10

## 2025-04-10 VITALS
BODY MASS INDEX: 25.31 KG/M2 | SYSTOLIC BLOOD PRESSURE: 135 MMHG | WEIGHT: 186.6 LBS | HEART RATE: 81 BPM | OXYGEN SATURATION: 95 % | DIASTOLIC BLOOD PRESSURE: 90 MMHG

## 2025-04-10 DIAGNOSIS — J44.1 COPD EXACERBATION (MULTI): ICD-10-CM

## 2025-04-10 DIAGNOSIS — R09.02 HYPOXIA: ICD-10-CM

## 2025-04-10 DIAGNOSIS — F17.200 SMOKER: Primary | ICD-10-CM

## 2025-04-10 PROCEDURE — 3080F DIAST BP >= 90 MM HG: CPT | Performed by: PEDIATRICS

## 2025-04-10 PROCEDURE — 99205 OFFICE O/P NEW HI 60 MIN: CPT | Performed by: PEDIATRICS

## 2025-04-10 PROCEDURE — 3075F SYST BP GE 130 - 139MM HG: CPT | Performed by: PEDIATRICS

## 2025-04-10 RX ORDER — BUDESONIDE, GLYCOPYRROLATE, AND FORMOTEROL FUMARATE 160; 9; 4.8 UG/1; UG/1; UG/1
2 AEROSOL, METERED RESPIRATORY (INHALATION) 2 TIMES DAILY
Qty: 10.7 G | Refills: 1 | Status: SHIPPED | OUTPATIENT
Start: 2025-04-10

## 2025-04-10 NOTE — TELEPHONE ENCOUNTER
Pt forgot to ask you if he should still be taking the breztri. I told the pt that I would call him back once I get answer from the .    Thank you!

## 2025-04-10 NOTE — LETTER
April 10, 2025     ABILIO Lopez-CNP  [address] [facility] [address]    Patient: Sanford Jones   YOB: 1975   Date of Visit: 4/10/2025       Dear Dr. Mojgan Vernon, ABILIO-CNP:    Thank you for referring Sanford Jones to me for evaluation. Below are my notes for this consultation.  If you have questions, please do not hesitate to call me. I look forward to following your patient along with you.       Sincerely,     Ramon Winter MD      CC: ABILIO Aquino-CNP  ______________________________________________________________________________________    Subjective  Patient ID: Sanford Jones is a 49 y.o. male who presents for COPD    HPI    Mr Jones is a 49yr old that was recently admitted with COPD exacerbation (3/8-10)  He was working in his wood shop without his dust mitigation system working, this caused him to get very short of breath by he next day. So he went to the ER and was admitted.  He was discharged with 2lpm oxygen, he needed that for about a week but now is not using.    Now he feels better than he has for a few years.  He was started on breztri at discharge.  He had not had any inhalers prior to that.    I reviewed the CT on admission, there are some emphysematous blebs but otherwise normal.    He is a former smoker 1ppd age 11 until this admission in March.    Review of Systems    See scanned documents attached to this note for review of systems, and appropriate scales/scores for this visit.    Objective  Physical Exam  Constitutional:       Appearance: Normal appearance.   HENT:      Head: Normocephalic and atraumatic.      Mouth/Throat:      Pharynx: Oropharynx is clear.   Cardiovascular:      Rate and Rhythm: Normal rate and regular rhythm.      Pulses: Normal pulses.      Heart sounds: Normal heart sounds.   Pulmonary:      Effort: Pulmonary effort is normal.      Breath sounds: Normal breath sounds. No wheezing, rhonchi or rales.   Abdominal:       General: Bowel sounds are normal.      Palpations: Abdomen is soft.   Musculoskeletal:         General: Normal range of motion.   Skin:     General: Skin is warm and dry.   Neurological:      General: No focal deficit present.      Mental Status: He is alert and oriented to person, place, and time.   Psychiatric:         Mood and Affect: Mood normal.       Assessment/Plan    49yr old with ?COPD, and recent admission with exacerbation       COPD:  likely,  feels better on breztri  - will get PFT (ordered by hospitalist)  - continue breztri with albuterol as needed.    2. Hypoxia: weaned off home O2 saturations mid to high 90's in room air  - will get nocturnal pulse ox in room air  [facility] is DME    3. Tobacco use: quit after hospitalization  1ppd >20yrs qualifies for low dose ct program   -would be due on/after 3/9/2026      Follow up 4-6 weeks after PFT       Ramon Winter MD 04/10/25 7:57 AM

## 2025-04-10 NOTE — PROGRESS NOTES
Subjective   Patient ID: Sanford Jones is a 49 y.o. male who presents for COPD    HPI    Mr Jones is a 49yr old that was recently admitted with COPD exacerbation (3/8-10)  He was working in his wood shop without his dust mitigation system working, this caused him to get very short of breath by he next day. So he went to the ER and was admitted.  He was discharged with 2lpm oxygen, he needed that for about a week but now is not using.    Now he feels better than he has for a few years.  He was started on breztri at discharge.  He had not had any inhalers prior to that.    I reviewed the CT on admission, there are some emphysematous blebs but otherwise normal.    He is a former smoker 1ppd age 11 until this admission in March.    Review of Systems    See scanned documents attached to this note for review of systems, and appropriate scales/scores for this visit.    Objective   Physical Exam  Constitutional:       Appearance: Normal appearance.   HENT:      Head: Normocephalic and atraumatic.      Mouth/Throat:      Pharynx: Oropharynx is clear.   Cardiovascular:      Rate and Rhythm: Normal rate and regular rhythm.      Pulses: Normal pulses.      Heart sounds: Normal heart sounds.   Pulmonary:      Effort: Pulmonary effort is normal.      Breath sounds: Normal breath sounds. No wheezing, rhonchi or rales.   Abdominal:      General: Bowel sounds are normal.      Palpations: Abdomen is soft.   Musculoskeletal:         General: Normal range of motion.   Skin:     General: Skin is warm and dry.   Neurological:      General: No focal deficit present.      Mental Status: He is alert and oriented to person, place, and time.   Psychiatric:         Mood and Affect: Mood normal.       Assessment/Plan     49yr old with ?COPD, and recent admission with exacerbation       COPD:  likely,  feels better on breztri  - will get PFT (ordered by hospitalist)  - continue breztri with albuterol as needed.    2. Hypoxia: weaned  off home O2 saturations mid to high 90's in room air  - will get nocturnal pulse ox in room air  [facility] is DME    3. Tobacco use: quit after hospitalization  1ppd >20yrs qualifies for low dose ct program   -would be due on/after 3/9/2026      Follow up 4-6 weeks after PFT       Ramon Winter MD 04/10/25 7:57 AM

## 2025-04-22 ENCOUNTER — HOSPITAL ENCOUNTER (OUTPATIENT)
Dept: RESPIRATORY THERAPY | Facility: HOSPITAL | Age: 50
Discharge: HOME | End: 2025-04-22
Payer: COMMERCIAL

## 2025-04-22 DIAGNOSIS — J44.1 COPD EXACERBATION (MULTI): ICD-10-CM

## 2025-04-22 PROCEDURE — 94640 AIRWAY INHALATION TREATMENT: CPT

## 2025-04-22 PROCEDURE — 2500000002 HC RX 250 W HCPCS SELF ADMINISTERED DRUGS (ALT 637 FOR MEDICARE OP, ALT 636 FOR OP/ED): Performed by: NURSE PRACTITIONER

## 2025-04-22 RX ORDER — ALBUTEROL SULFATE 90 UG/1
1 INHALANT RESPIRATORY (INHALATION) ONCE
Status: COMPLETED | OUTPATIENT
Start: 2025-04-22 | End: 2025-04-22

## 2025-04-22 RX ORDER — ALBUTEROL SULFATE 0.83 MG/ML
3 SOLUTION RESPIRATORY (INHALATION) ONCE
Status: COMPLETED | OUTPATIENT
Start: 2025-04-22 | End: 2025-04-22

## 2025-04-22 RX ADMIN — ALBUTEROL SULFATE 3 ML: 2.5 SOLUTION RESPIRATORY (INHALATION) at 09:29

## 2025-04-24 LAB
MGC ASCENT PFT - FEV1 - POST: 2.35
MGC ASCENT PFT - FEV1 - PRE: 1.9
MGC ASCENT PFT - FEV1 - PREDICTED: 3.92
MGC ASCENT PFT - FVC - POST: 5.42
MGC ASCENT PFT - FVC - PRE: 4.58
MGC ASCENT PFT - FVC - PREDICTED: 4.97

## 2025-04-30 ENCOUNTER — PATIENT OUTREACH (OUTPATIENT)
Dept: PRIMARY CARE | Facility: CLINIC | Age: 50
End: 2025-04-30
Payer: COMMERCIAL

## 2025-05-08 ENCOUNTER — APPOINTMENT (OUTPATIENT)
Dept: PULMONOLOGY | Facility: CLINIC | Age: 50
End: 2025-05-08
Payer: COMMERCIAL

## 2025-05-08 VITALS
HEART RATE: 84 BPM | BODY MASS INDEX: 25.9 KG/M2 | OXYGEN SATURATION: 97 % | WEIGHT: 191 LBS | SYSTOLIC BLOOD PRESSURE: 120 MMHG | DIASTOLIC BLOOD PRESSURE: 77 MMHG

## 2025-05-08 DIAGNOSIS — J44.1 COPD EXACERBATION (MULTI): ICD-10-CM

## 2025-05-08 DIAGNOSIS — J96.01 ACUTE RESPIRATORY FAILURE WITH HYPOXIA: Primary | ICD-10-CM

## 2025-05-08 PROCEDURE — 99215 OFFICE O/P EST HI 40 MIN: CPT | Performed by: PEDIATRICS

## 2025-05-08 PROCEDURE — 3074F SYST BP LT 130 MM HG: CPT | Performed by: PEDIATRICS

## 2025-05-08 PROCEDURE — 3078F DIAST BP <80 MM HG: CPT | Performed by: PEDIATRICS

## 2025-05-08 ASSESSMENT — PATIENT HEALTH QUESTIONNAIRE - PHQ9
1. LITTLE INTEREST OR PLEASURE IN DOING THINGS: NOT AT ALL
SUM OF ALL RESPONSES TO PHQ9 QUESTIONS 1 AND 2: 0
2. FEELING DOWN, DEPRESSED OR HOPELESS: NOT AT ALL

## 2025-05-08 NOTE — PROGRESS NOTES
Subjective   Patient ID: Sanford Jones is a 49 y.o. male who presents for COPD    HPI    5/8/2025:  Sanford is doing well, he is using breztri and never needs albuterol. He did a PFT that shows severe obstruction with bronchodilator response.      Initial visit4/10/2025:  Mr Jones is a 49yr old that was recently admitted with COPD exacerbation (3/8-10)  He was working in his wood shop without his dust mitigation system working, this caused him to get very short of breath by he next day. So he went to the ER and was admitted.  He was discharged with 2lpm oxygen, he needed that for about a week but now is not using.    Now he feels better than he has for a few years.  He was started on breztri at discharge.  He had not had any inhalers prior to that.     I reviewed the CT on admission, there are some emphysematous blebs but otherwise normal.     He is a former smoker 1ppd age 11 until this admission in March.    Review of Systems    See scanned documents attached to this note for review of systems, and appropriate scales/scores for this visit.     Objective   Physical Exam  Constitutional:       Appearance: Normal appearance.   HENT:      Head: Normocephalic and atraumatic.      Mouth/Throat:      Pharynx: Oropharynx is clear.   Cardiovascular:      Rate and Rhythm: Normal rate and regular rhythm.      Pulses: Normal pulses.      Heart sounds: Normal heart sounds.   Pulmonary:      Effort: Pulmonary effort is normal.      Breath sounds: Normal breath sounds. No wheezing, rhonchi or rales.   Abdominal:      General: Bowel sounds are normal.      Palpations: Abdomen is soft.   Musculoskeletal:         General: Normal range of motion.   Skin:     General: Skin is warm and dry.   Neurological:      General: No focal deficit present.      Mental Status: He is alert and oriented to person, place, and time.   Psychiatric:         Mood and Affect: Mood normal.       Assessment/Plan       49yr old with ?COPD, and recent  admission with exacerbation        COPD:  likely,  feels better on breztri  - will get PFT (ordered by hospitalist)  - continue breztri with albuterol as needed.  5/8/2025:  continue breztri and albuterol     2. Hypoxia: weaned off home O2 saturations mid to high 90's in room air  - will get nocturnal pulse ox in room air  [facility] is DME  5/8/2025:  order to dc oxygen [facility]     3. Tobacco use: quit after hospitalization  1ppd >20yrs qualifies for low dose ct program   -would be due on/after 3/9/2026  5/8/2025: will enroll in LDCT program due on/after 3/9/2026        Follow up 6 months       Ramon Winter MD 05/08/25 10:32 AM

## 2025-07-01 DIAGNOSIS — K57.90 DIVERTICULOSIS: ICD-10-CM

## 2025-07-01 RX ORDER — PSYLLIUM HUSK 0.4 G
3-5 CAPSULE ORAL DAILY
Qty: 150 CAPSULE | Refills: 3 | Status: SHIPPED | OUTPATIENT
Start: 2025-07-01 | End: 2026-07-01

## 2025-07-08 ENCOUNTER — OFFICE VISIT (OUTPATIENT)
Dept: PRIMARY CARE | Facility: CLINIC | Age: 50
End: 2025-07-08
Payer: COMMERCIAL

## 2025-07-08 VITALS
SYSTOLIC BLOOD PRESSURE: 130 MMHG | HEART RATE: 79 BPM | BODY MASS INDEX: 26.04 KG/M2 | DIASTOLIC BLOOD PRESSURE: 81 MMHG | WEIGHT: 192 LBS

## 2025-07-08 DIAGNOSIS — E03.9 ACQUIRED HYPOTHYROIDISM: Primary | ICD-10-CM

## 2025-07-08 DIAGNOSIS — G56.22 IMPINGEMENT OF LEFT ULNAR NERVE: ICD-10-CM

## 2025-07-08 DIAGNOSIS — R25.2 MUSCLE CRAMPING: ICD-10-CM

## 2025-07-08 PROCEDURE — 99213 OFFICE O/P EST LOW 20 MIN: CPT | Performed by: REGISTERED NURSE

## 2025-07-08 PROCEDURE — 3079F DIAST BP 80-89 MM HG: CPT | Performed by: REGISTERED NURSE

## 2025-07-08 PROCEDURE — 3075F SYST BP GE 130 - 139MM HG: CPT | Performed by: REGISTERED NURSE

## 2025-07-08 RX ORDER — PREDNISONE 20 MG/1
TABLET ORAL
Qty: 30 TABLET | Refills: 0 | Status: SHIPPED | OUTPATIENT
Start: 2025-07-08 | End: 2025-07-23

## 2025-07-08 NOTE — PROGRESS NOTES
Subjective   Patient ID: Sanford Jones is a 50 y.o. male who presents for Numbness (Left arm and near wrist/hand; tinglings as well; happens in his sleep and while he is driving; ).    HPI    Left arm: Numbness and tingling. Elbow down and near wrist. Sleeping, and while driving.   He was sleeping and woke up a few weeks ago and his arm was asleep. This keeps happening. Not painful. Some pain in the shoulder and elbow. Mostly his thumb and first digit. Trying to sleep with his arm straight. Worsening with elbow flexion.   Has tried naproxen, Advil, ibuprofen and this did not help. Voltaren helped little. Not getting much sleep at night.   Unsure of any known injury.     Review of systems completed and unremarkable other than what is documented in HPI.    Objective   /81 (BP Location: Right arm, Patient Position: Sitting, BP Cuff Size: Adult)   Pulse 79   Wt 87.1 kg (192 lb)   BMI 26.04 kg/m²     No data recorded    Physical Exam  Musculoskeletal:      Left elbow: Normal range of motion. Tenderness present.   Neurological:      Comments: + Tinel's sign on left elbow            Assessment/Plan         #Ulnar nerve compression   #Cubital tunnel syndrome   Trial prednisone   Can continue antiinflammatories  Encouraged elbow brace at night   Resting as able   Follow up in 6 weeks to assess symptoms     #COPD   Quit smoking!   Using nicotine pouches   Following with Dr. Winter   States they want him to do pulm Rehab   He is not able to with his work schedule     #Diverticulitis   Usually resolved with with Augmentin  Has tried cipro/flagyl before and had [facility]   Can call if this happens again and will send atb   Has followed with GI   They want him to have Surgery   He declines for now until he retires/has a different job

## 2025-07-18 LAB
ALBUMIN SERPL-MCNC: 4.3 G/DL (ref 3.6–5.1)
ALP SERPL-CCNC: 60 U/L (ref 35–144)
ALT SERPL-CCNC: 42 U/L (ref 9–46)
ANION GAP SERPL CALCULATED.4IONS-SCNC: 8 MMOL/L (CALC) (ref 7–17)
AST SERPL-CCNC: 17 U/L (ref 10–35)
BASOPHILS # BLD AUTO: 14 CELLS/UL (ref 0–200)
BASOPHILS NFR BLD AUTO: 0.1 %
BILIRUB SERPL-MCNC: 0.4 MG/DL (ref 0.2–1.2)
BUN SERPL-MCNC: 22 MG/DL (ref 7–25)
CALCIUM SERPL-MCNC: 9.2 MG/DL (ref 8.6–10.3)
CHLORIDE SERPL-SCNC: 101 MMOL/L (ref 98–110)
CO2 SERPL-SCNC: 27 MMOL/L (ref 20–32)
CREAT SERPL-MCNC: 0.89 MG/DL (ref 0.7–1.3)
EGFRCR SERPLBLD CKD-EPI 2021: 103 ML/MIN/1.73M2
EOSINOPHIL # BLD AUTO: 28 CELLS/UL (ref 15–500)
EOSINOPHIL NFR BLD AUTO: 0.2 %
ERYTHROCYTE [DISTWIDTH] IN BLOOD BY AUTOMATED COUNT: 13.9 % (ref 11–15)
GLUCOSE SERPL-MCNC: 115 MG/DL (ref 65–139)
HCT VFR BLD AUTO: 44.2 % (ref 38.5–50)
HGB BLD-MCNC: 14.5 G/DL (ref 13.2–17.1)
LYMPHOCYTES # BLD AUTO: 3105 CELLS/UL (ref 850–3900)
LYMPHOCYTES NFR BLD AUTO: 22.5 %
MAGNESIUM SERPL-MCNC: 2.2 MG/DL (ref 1.5–2.5)
MCH RBC QN AUTO: 30.7 PG (ref 27–33)
MCHC RBC AUTO-ENTMCNC: 32.8 G/DL (ref 32–36)
MCV RBC AUTO: 93.4 FL (ref 80–100)
MONOCYTES # BLD AUTO: 856 CELLS/UL (ref 200–950)
MONOCYTES NFR BLD AUTO: 6.2 %
NEUTROPHILS # BLD AUTO: 9798 CELLS/UL (ref 1500–7800)
NEUTROPHILS NFR BLD AUTO: 71 %
PLATELET # BLD AUTO: 262 THOUSAND/UL (ref 140–400)
PMV BLD REES-ECKER: 10.6 FL (ref 7.5–12.5)
POTASSIUM SERPL-SCNC: 4.4 MMOL/L (ref 3.5–5.3)
PROT SERPL-MCNC: 7 G/DL (ref 6.1–8.1)
RBC # BLD AUTO: 4.73 MILLION/UL (ref 4.2–5.8)
SODIUM SERPL-SCNC: 136 MMOL/L (ref 135–146)
T4 FREE SERPL-MCNC: 1.5 NG/DL (ref 0.8–1.8)
TSH SERPL-ACNC: 0.31 MIU/L (ref 0.4–4.5)
WBC # BLD AUTO: 13.8 THOUSAND/UL (ref 3.8–10.8)

## 2025-07-21 DIAGNOSIS — E03.9 ACQUIRED HYPOTHYROIDISM: ICD-10-CM

## 2025-07-21 RX ORDER — LEVOTHYROXINE SODIUM 125 UG/1
125 TABLET ORAL DAILY
Qty: 30 TABLET | Refills: 11 | Status: SHIPPED | OUTPATIENT
Start: 2025-07-21 | End: 2026-07-21

## 2025-08-13 ENCOUNTER — OFFICE VISIT (OUTPATIENT)
Dept: URGENT CARE | Facility: URGENT CARE | Age: 50
End: 2025-08-13
Payer: COMMERCIAL

## 2025-08-13 VITALS
TEMPERATURE: 98.1 F | HEART RATE: 78 BPM | BODY MASS INDEX: 24.41 KG/M2 | SYSTOLIC BLOOD PRESSURE: 139 MMHG | DIASTOLIC BLOOD PRESSURE: 85 MMHG | WEIGHT: 180 LBS | OXYGEN SATURATION: 97 % | RESPIRATION RATE: 17 BRPM
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
## 2025-08-13 PROCEDURE — 3079F DIAST BP 80-89 MM HG: CPT

## 2025-08-13 PROCEDURE — 99213 OFFICE O/P EST LOW 20 MIN: CPT

## 2025-08-13 PROCEDURE — 3075F SYST BP GE 130 - 139MM HG: CPT

## 2025-08-13 RX ORDER — TRIAMCINOLONE ACETONIDE 1 MG/G
CREAM TOPICAL 2 TIMES DAILY
Qty: 30 G | Refills: 0 | Status: SHIPPED | OUTPATIENT
Start: 2025-08-13 | End: 2025-08-20

## 2025-08-13 RX ORDER — CETIRIZINE HYDROCHLORIDE 10 MG/1
10 TABLET ORAL DAILY
Qty: 7 TABLET | Refills: 0 | Status: SHIPPED | OUTPATIENT
Start: 2025-08-13 | End: 2025-08-20

## 2025-08-13 RX ORDER — CEPHALEXIN 500 MG/1
500 CAPSULE ORAL 4 TIMES DAILY
Qty: 20 CAPSULE | Refills: 0 | Status: SHIPPED | OUTPATIENT
Start: 2025-08-13 | End: 2025-08-18

## 2025-08-14 ENCOUNTER — TELEPHONE (OUTPATIENT)
Dept: URGENT CARE | Facility: URGENT CARE | Age: 50
End: 2025-08-14

## 2025-08-20 ENCOUNTER — APPOINTMENT (OUTPATIENT)
Dept: PRIMARY CARE | Facility: CLINIC | Age: 50
End: 2025-08-20
Payer: COMMERCIAL

## 2025-08-20 VITALS
HEART RATE: 87 BPM | BODY MASS INDEX: 26.72 KG/M2 | SYSTOLIC BLOOD PRESSURE: 124 MMHG | DIASTOLIC BLOOD PRESSURE: 78 MMHG | WEIGHT: 197 LBS

## 2025-08-20 DIAGNOSIS — E03.9 ACQUIRED HYPOTHYROIDISM: ICD-10-CM

## 2025-08-20 DIAGNOSIS — G56.32 RADIAL NERVE COMPRESSION, LEFT: Primary | ICD-10-CM

## 2025-08-20 DIAGNOSIS — M54.2 NECK PAIN ON LEFT SIDE: ICD-10-CM

## 2025-08-20 DIAGNOSIS — M54.12 CERVICAL RADICULOPATHY: ICD-10-CM

## 2025-08-20 DIAGNOSIS — G62.89 OTHER POLYNEUROPATHY: ICD-10-CM

## 2025-08-20 PROCEDURE — 3074F SYST BP LT 130 MM HG: CPT | Performed by: REGISTERED NURSE

## 2025-08-20 PROCEDURE — 99214 OFFICE O/P EST MOD 30 MIN: CPT | Performed by: REGISTERED NURSE

## 2025-08-20 PROCEDURE — 3078F DIAST BP <80 MM HG: CPT | Performed by: REGISTERED NURSE

## 2025-08-24 DIAGNOSIS — G56.22 IMPINGEMENT OF LEFT ULNAR NERVE: ICD-10-CM

## 2025-08-24 DIAGNOSIS — R25.2 MUSCLE CRAMPING: ICD-10-CM

## 2025-08-25 RX ORDER — PREDNISONE 20 MG/1
TABLET ORAL
Qty: 30 TABLET | Refills: 0 | OUTPATIENT
Start: 2025-08-25 | End: 2025-09-09

## 2025-08-27 LAB
ALBUMIN SERPL-MCNC: 4.7 G/DL (ref 3.6–5.1)
ALP SERPL-CCNC: 68 U/L (ref 35–144)
ALT SERPL-CCNC: 54 U/L (ref 9–46)
ANION GAP SERPL CALCULATED.4IONS-SCNC: 9 MMOL/L (CALC) (ref 7–17)
AST SERPL-CCNC: 33 U/L (ref 10–35)
BILIRUB SERPL-MCNC: 0.4 MG/DL (ref 0.2–1.2)
BUN SERPL-MCNC: 15 MG/DL (ref 7–25)
CALCIUM SERPL-MCNC: 9.7 MG/DL (ref 8.6–10.3)
CHLORIDE SERPL-SCNC: 102 MMOL/L (ref 98–110)
CO2 SERPL-SCNC: 25 MMOL/L (ref 20–32)
CREAT SERPL-MCNC: 1.03 MG/DL (ref 0.7–1.3)
EGFRCR SERPLBLD CKD-EPI 2021: 87 ML/MIN/1.73M2
GLUCOSE SERPL-MCNC: 98 MG/DL (ref 65–139)
POTASSIUM SERPL-SCNC: 4.6 MMOL/L (ref 3.5–5.3)
PROT SERPL-MCNC: 7.6 G/DL (ref 6.1–8.1)
PYRIDOXAL PHOS SERPL-MCNC: NORMAL UG/L
SODIUM SERPL-SCNC: 136 MMOL/L (ref 135–146)
TSH SERPL-ACNC: 3.32 MIU/L (ref 0.4–4.5)
VIT B12 SERPL-MCNC: 358 PG/ML (ref 200–1100)

## 2025-09-02 ENCOUNTER — APPOINTMENT (OUTPATIENT)
Dept: RADIOLOGY | Facility: HOSPITAL | Age: 50
End: 2025-09-02
Payer: COMMERCIAL

## 2025-09-02 ENCOUNTER — HOSPITAL ENCOUNTER (OUTPATIENT)
Dept: RADIOLOGY | Facility: HOSPITAL | Age: 50
Discharge: HOME | End: 2025-09-02
Payer: COMMERCIAL

## 2025-09-02 DIAGNOSIS — S00.95XA FOREIGN BODY HEAD, INITIAL ENCOUNTER: Primary | ICD-10-CM

## 2025-09-02 DIAGNOSIS — S00.95XA FOREIGN BODY HEAD, INITIAL ENCOUNTER: ICD-10-CM

## 2025-09-02 PROCEDURE — 70200 X-RAY EXAM OF EYE SOCKETS: CPT

## 2025-09-02 PROCEDURE — 70200 X-RAY EXAM OF EYE SOCKETS: CPT | Performed by: RADIOLOGY

## 2025-09-03 ENCOUNTER — RESULTS FOLLOW-UP (OUTPATIENT)
Dept: PRIMARY CARE | Facility: CLINIC | Age: 50
End: 2025-09-03
Payer: COMMERCIAL

## 2025-09-03 DIAGNOSIS — M54.12 CERVICAL RADICULOPATHY: Primary | ICD-10-CM

## 2025-09-05 LAB
ALBUMIN SERPL-MCNC: 4.7 G/DL (ref 3.6–5.1)
ALP SERPL-CCNC: 68 U/L (ref 35–144)
ALT SERPL-CCNC: 54 U/L (ref 9–46)
ANION GAP SERPL CALCULATED.4IONS-SCNC: 9 MMOL/L (CALC) (ref 7–17)
AST SERPL-CCNC: 33 U/L (ref 10–35)
BILIRUB SERPL-MCNC: 0.4 MG/DL (ref 0.2–1.2)
BUN SERPL-MCNC: 15 MG/DL (ref 7–25)
CALCIUM SERPL-MCNC: 9.7 MG/DL (ref 8.6–10.3)
CHLORIDE SERPL-SCNC: 102 MMOL/L (ref 98–110)
CO2 SERPL-SCNC: 25 MMOL/L (ref 20–32)
CREAT SERPL-MCNC: 1.03 MG/DL (ref 0.7–1.3)
EGFRCR SERPLBLD CKD-EPI 2021: 87 ML/MIN/1.73M2
GLUCOSE SERPL-MCNC: 98 MG/DL (ref 65–139)
POTASSIUM SERPL-SCNC: 4.6 MMOL/L (ref 3.5–5.3)
PROT SERPL-MCNC: 7.6 G/DL (ref 6.1–8.1)
PYRIDOXAL PHOS SERPL-MCNC: 32.9 NG/ML (ref 2.1–21.7)
SODIUM SERPL-SCNC: 136 MMOL/L (ref 135–146)
TSH SERPL-ACNC: 3.32 MIU/L (ref 0.4–4.5)
VIT B12 SERPL-MCNC: 358 PG/ML (ref 200–1100)

## 2025-11-10 ENCOUNTER — APPOINTMENT (OUTPATIENT)
Dept: PULMONOLOGY | Facility: CLINIC | Age: 50
End: 2025-11-10
Payer: COMMERCIAL